# Patient Record
Sex: MALE | Race: ASIAN
[De-identification: names, ages, dates, MRNs, and addresses within clinical notes are randomized per-mention and may not be internally consistent; named-entity substitution may affect disease eponyms.]

---

## 2020-07-15 ENCOUNTER — APPOINTMENT (OUTPATIENT)
Age: 75
End: 2020-07-15
Payer: MEDICARE

## 2020-07-15 VITALS
HEART RATE: 64 BPM | HEIGHT: 67.72 IN | SYSTOLIC BLOOD PRESSURE: 126 MMHG | OXYGEN SATURATION: 97 % | BODY MASS INDEX: 26.37 KG/M2 | WEIGHT: 172 LBS | TEMPERATURE: 98.4 F | RESPIRATION RATE: 18 BRPM | DIASTOLIC BLOOD PRESSURE: 66 MMHG

## 2020-07-15 DIAGNOSIS — Z86.39 PERSONAL HISTORY OF OTHER ENDOCRINE, NUTRITIONAL AND METABOLIC DISEASE: ICD-10-CM

## 2020-07-15 DIAGNOSIS — Z86.79 PERSONAL HISTORY OF OTHER DISEASES OF THE CIRCULATORY SYSTEM: ICD-10-CM

## 2020-07-15 DIAGNOSIS — Z78.9 OTHER SPECIFIED HEALTH STATUS: ICD-10-CM

## 2020-07-15 PROBLEM — Z00.00 ENCOUNTER FOR PREVENTIVE HEALTH EXAMINATION: Status: ACTIVE | Noted: 2020-07-15

## 2020-07-15 PROCEDURE — 99203 OFFICE O/P NEW LOW 30 MIN: CPT

## 2020-07-15 RX ORDER — AMLODIPINE BESYLATE 5 MG/1
5 TABLET ORAL
Refills: 0 | Status: ACTIVE | COMMUNITY

## 2020-07-15 RX ORDER — ATORVASTATIN CALCIUM 20 MG/1
20 TABLET, FILM COATED ORAL
Refills: 0 | Status: ACTIVE | COMMUNITY

## 2020-07-15 RX ORDER — LOSARTAN POTASSIUM 100 MG/1
100 TABLET, FILM COATED ORAL
Refills: 0 | Status: ACTIVE | COMMUNITY

## 2020-07-15 NOTE — PHYSICAL EXAM
[General Appearance - Well Developed] : well developed [General Appearance - Well Nourished] : well nourished [Normal Appearance] : normal appearance [Well Groomed] : well groomed [General Appearance - In No Acute Distress] : no acute distress [Edema] : no peripheral edema [Respiration, Rhythm And Depth] : normal respiratory rhythm and effort [Exaggerated Use Of Accessory Muscles For Inspiration] : no accessory muscle use [Abdomen Soft] : soft [Abdomen Tenderness] : non-tender [Costovertebral Angle Tenderness] : no ~M costovertebral angle tenderness [Urethral Meatus] : meatus normal [Penis Abnormality] : normal circumcised penis [Urinary Bladder Findings] : the bladder was normal on palpation [Scrotum] : the scrotum was normal [Epididymis] : the epididymides were normal [Testes Tenderness] : no tenderness of the testes [Prostate Tenderness] : the prostate was not tender [Testes Mass (___cm)] : there were no testicular masses [Prostate Size ___ gm] : prostate size [unfilled] gm [No Prostate Nodules] : no prostate nodules [Normal Station and Gait] : the gait and station were normal for the patient's age [] : no rash [No Focal Deficits] : no focal deficits [Oriented To Time, Place, And Person] : oriented to person, place, and time [Affect] : the affect was normal [Mood] : the mood was normal [Not Anxious] : not anxious [No Palpable Adenopathy] : no palpable adenopathy

## 2020-07-19 PROBLEM — Z86.39 HISTORY OF HYPERLIPIDEMIA: Status: RESOLVED | Noted: 2020-07-19 | Resolved: 2020-07-19

## 2020-07-19 PROBLEM — Z86.79 HISTORY OF HYPERTENSION: Status: RESOLVED | Noted: 2020-07-19 | Resolved: 2020-07-19

## 2020-07-19 NOTE — HISTORY OF PRESENT ILLNESS
[FreeTextEntry1] : 76yo Tamazight speaking M presents with 4-5 yr history of LUTS\par Sometimes straining, incomplete bladder emptying, weak stream, urgency\par no dysuria, no gross hematuria\par no UTI history\par Nocturia 2-3/night, voiding every 3 hours\par Drinks 2L of water, 1-2 cups of coffee daily\par no incontinence\par normal bowel movements\par Has never taken BPH meds

## 2020-07-19 NOTE — ASSESSMENT
[FreeTextEntry1] : 76 yo M with BPH, LUTS\par \par - PVR = 187ml\par - Natural history of BPH and bladder outlet obstruction reviewed, risks of progression, risks of detrussor myopathy/areflexic bladder, bladder stones, UTI, worsening symptoms, risk of retention and other issues. \par All treatment options were reviewed. This included surveillance, all medical therapeutic options, all outlet procedures including office based, TURP, bipolar TURP, button vaporization, thulium/holmium, suprapubic/retropubic simple (open, robotic) prostatectomy. \par All potential side effects of treatment were reviewed including, but not limited to: short term or permanent stress urinary incontinence and/or short term or permanent erectile dysfunction, penile shortening, rectal symptoms/pain, perineal pain, risks of conversion from MIS to open surgery discussed, bleeding//life-threatening hemorrhage, rectal injury requiring colostomy or delayed recognition leading to fistula, vascular/bowel/adjacent visceral organ injury, trocar/access injury, the possibility of recognized vs. unrecognized/delayed-recognition injury, risks of thermal/blunt/sharp/retraction injury, risks of DVT, PE, MI, death, risks of cardiopulmonary/anesthesia related complications, positional injury, infection/collection/abscess, wound complications/dehiscence/seroma/cellulitis, urinoma/fistula, ureteral injury/obstruction, bladder neck contracture, penile shortening, meatal stenosis, urethral stricture, prolonged vesicostomy or capsular leak, and other complications.\par - Tamsulosin and finasteride rx transmitted\par - FU in 3 months

## 2020-07-20 LAB
APPEARANCE: CLEAR
BILIRUBIN URINE: NEGATIVE
BLOOD URINE: NEGATIVE
COLOR: COLORLESS
GLUCOSE QUALITATIVE U: NEGATIVE
KETONES URINE: NEGATIVE
LEUKOCYTE ESTERASE URINE: NEGATIVE
NITRITE URINE: NEGATIVE
PH URINE: 6.5
PROTEIN URINE: NEGATIVE
SPECIFIC GRAVITY URINE: 1.01
UROBILINOGEN URINE: NORMAL

## 2020-09-23 ENCOUNTER — APPOINTMENT (OUTPATIENT)
Age: 75
End: 2020-09-23
Payer: MEDICARE

## 2020-09-23 VITALS
HEART RATE: 76 BPM | OXYGEN SATURATION: 96 % | SYSTOLIC BLOOD PRESSURE: 136 MMHG | BODY MASS INDEX: 27.29 KG/M2 | RESPIRATION RATE: 18 BRPM | TEMPERATURE: 98.3 F | WEIGHT: 178 LBS | DIASTOLIC BLOOD PRESSURE: 74 MMHG

## 2020-09-23 PROCEDURE — 99213 OFFICE O/P EST LOW 20 MIN: CPT

## 2020-09-23 RX ORDER — TAMSULOSIN HYDROCHLORIDE 0.4 MG/1
0.4 CAPSULE ORAL
Qty: 90 | Refills: 3 | Status: DISCONTINUED | COMMUNITY
Start: 2020-07-15 | End: 2020-09-23

## 2020-10-15 NOTE — HISTORY OF PRESENT ILLNESS
[FreeTextEntry1] : 74yo Greek speaking M presents with 4-5 yr history of LUTS\par Sometimes straining, incomplete bladder emptying, weak stream, urgency\par no dysuria, no gross hematuria\par no UTI history\par Nocturia 2-3/night, voiding every 3 hours\par Drinks 2L of water, 1-2 cups of coffee daily\par no incontinence\par normal bowel movements\par Has never taken BPH meds\par \par 9/23/20 Interval history: Stopped tamsulosin and finasteride after a month\par Started complaining of dizziness and fell once so was told by PCP to stop\par LUTS initially improved with meds but resumed to baseline once stopping meds

## 2020-10-15 NOTE — ASSESSMENT
[FreeTextEntry1] : 74 yo M with BPH and LUTS\par \par - PVR = 51ml\par - OK to stop tamsulosin as this is likely the reason for his dizziness. Can continue finasteride\par - Also discussed non-medical options given the side effects he has has but pt does not want to proceed with any surgical options at this time

## 2020-10-15 NOTE — PHYSICAL EXAM
[General Appearance - Well Developed] : well developed [General Appearance - Well Nourished] : well nourished [Normal Appearance] : normal appearance [Well Groomed] : well groomed [General Appearance - In No Acute Distress] : no acute distress [Abdomen Soft] : soft [Abdomen Tenderness] : non-tender [Costovertebral Angle Tenderness] : no ~M costovertebral angle tenderness [Urethral Meatus] : meatus normal [Penis Abnormality] : normal circumcised penis [Urinary Bladder Findings] : the bladder was normal on palpation [Scrotum] : the scrotum was normal [Epididymis] : the epididymides were normal [Testes Tenderness] : no tenderness of the testes [Testes Mass (___cm)] : there were no testicular masses [Prostate Tenderness] : the prostate was not tender [No Prostate Nodules] : no prostate nodules [Prostate Size ___ gm] : prostate size [unfilled] gm [Edema] : no peripheral edema [] : no respiratory distress [Respiration, Rhythm And Depth] : normal respiratory rhythm and effort [Exaggerated Use Of Accessory Muscles For Inspiration] : no accessory muscle use [Oriented To Time, Place, And Person] : oriented to person, place, and time [Affect] : the affect was normal [Mood] : the mood was normal [Not Anxious] : not anxious [Normal Station and Gait] : the gait and station were normal for the patient's age [No Focal Deficits] : no focal deficits [No Palpable Adenopathy] : no palpable adenopathy [FreeTextEntry1] : deferred today

## 2021-09-23 ENCOUNTER — APPOINTMENT (OUTPATIENT)
Dept: UROLOGY | Facility: CLINIC | Age: 76
End: 2021-09-23
Payer: MEDICARE

## 2021-09-23 PROCEDURE — 99213 OFFICE O/P EST LOW 20 MIN: CPT

## 2021-09-23 NOTE — PHYSICAL EXAM
[General Appearance - Well Developed] : well developed [Normal Appearance] : normal appearance [General Appearance - Well Nourished] : well nourished [Well Groomed] : well groomed [General Appearance - In No Acute Distress] : no acute distress [Abdomen Soft] : soft [Abdomen Tenderness] : non-tender [Costovertebral Angle Tenderness] : no ~M costovertebral angle tenderness [Urethral Meatus] : meatus normal [Penis Abnormality] : normal circumcised penis [Urinary Bladder Findings] : the bladder was normal on palpation [Scrotum] : the scrotum was normal [Epididymis] : the epididymides were normal [Testes Tenderness] : no tenderness of the testes [Testes Mass (___cm)] : there were no testicular masses [Prostate Tenderness] : the prostate was not tender [No Prostate Nodules] : no prostate nodules [Prostate Size ___ gm] : prostate size [unfilled] gm [] : no respiratory distress [Edema] : no peripheral edema [Respiration, Rhythm And Depth] : normal respiratory rhythm and effort [Exaggerated Use Of Accessory Muscles For Inspiration] : no accessory muscle use [Oriented To Time, Place, And Person] : oriented to person, place, and time [Affect] : the affect was normal [Mood] : the mood was normal [Not Anxious] : not anxious [Normal Station and Gait] : the gait and station were normal for the patient's age [No Focal Deficits] : no focal deficits [No Palpable Adenopathy] : no palpable adenopathy

## 2021-09-26 RX ORDER — FINASTERIDE 5 MG/1
5 TABLET, FILM COATED ORAL
Qty: 90 | Refills: 3 | Status: COMPLETED | COMMUNITY
Start: 2020-07-15 | End: 2021-09-26

## 2021-09-26 NOTE — HISTORY OF PRESENT ILLNESS
[FreeTextEntry1] : 76yo Estonian speaking M presents with 4-5 yr history of LUTS\par Sometimes straining, incomplete bladder emptying, weak stream, urgency\par no dysuria, no gross hematuria\par no UTI history\par Nocturia 2-3/night, voiding every 3 hours\par Drinks 2L of water, 1-2 cups of coffee daily\par no incontinence\par normal bowel movements\par Has never taken BPH meds\par \par 9/23/20 Interval history: Stopped tamsulosin and finasteride after a month\par Started complaining of dizziness and fell once so was told by PCP to stop\par LUTS initially improved with meds but resumed to baseline once stopping meds\par \par 9/23/21 Interval history: Stopped all BPH meds completely since last visit\par some urgency but no frequency (q3hrs), nocturia 3/night\par drinks fluids in the evening including a cup of coffee\par Drinks 4 bottles of water and 3 cups of coffee throughout the day\par normal bowel movements\par no changes in health since last visit

## 2021-09-26 NOTE — ASSESSMENT
[FreeTextEntry1] : 77 yo M with BPH, LUTS\par \par - PVR = 5ml\par - UA\par - Discussed possible etiologies for LUTS. Discussed ways to manage them including behavioral modifications such as adequate hydration, controlling constipation, restricting fluids in the evening. Emphasized cutting back on caffeine intake, especially in the evening\par - Natural history of BPH and bladder outlet obstruction reviewed, risks of progression, risks of detrussor myopathy/areflexic bladder, bladder stones, UTI, worsening symptoms, risk of retention and other issues. \par All treatment options were reviewed. This included surveillance, all medical therapeutic options, all outlet procedures including office based, TURP, bipolar TURP, button vaporization, thulium/holmium, suprapubic/retropubic simple (open, robotic) prostatectomy. \par - Given that pt is voiding to completion with minimal bothersome symptoms. Will continue observation\par - FU as needed

## 2021-09-27 LAB
APPEARANCE: CLEAR
BILIRUBIN URINE: NEGATIVE
BLOOD URINE: NEGATIVE
COLOR: NORMAL
GLUCOSE QUALITATIVE U: NEGATIVE
KETONES URINE: NEGATIVE
LEUKOCYTE ESTERASE URINE: NEGATIVE
NITRITE URINE: NEGATIVE
PH URINE: 6.5
PROTEIN URINE: NEGATIVE
SPECIFIC GRAVITY URINE: 1.01
UROBILINOGEN URINE: NORMAL

## 2023-02-06 ENCOUNTER — APPOINTMENT (OUTPATIENT)
Dept: UROLOGY | Facility: CLINIC | Age: 78
End: 2023-02-06
Payer: MEDICARE

## 2023-02-06 VITALS
SYSTOLIC BLOOD PRESSURE: 93 MMHG | WEIGHT: 175 LBS | HEIGHT: 67.72 IN | OXYGEN SATURATION: 98 % | HEART RATE: 75 BPM | BODY MASS INDEX: 26.83 KG/M2 | DIASTOLIC BLOOD PRESSURE: 55 MMHG | TEMPERATURE: 98.3 F

## 2023-02-06 DIAGNOSIS — Z87.891 PERSONAL HISTORY OF NICOTINE DEPENDENCE: ICD-10-CM

## 2023-02-06 PROCEDURE — 99214 OFFICE O/P EST MOD 30 MIN: CPT

## 2023-02-13 LAB
APPEARANCE: CLEAR
BACTERIA UR CULT: NORMAL
BACTERIA: NEGATIVE
BILIRUBIN URINE: NEGATIVE
BLOOD URINE: NEGATIVE
COLOR: YELLOW
GLUCOSE QUALITATIVE U: ABNORMAL
HYALINE CASTS: 2 /LPF
KETONES URINE: NEGATIVE
LEUKOCYTE ESTERASE URINE: NEGATIVE
MICROSCOPIC-UA: NORMAL
NITRITE URINE: NEGATIVE
PH URINE: 6
PROTEIN URINE: NORMAL
RED BLOOD CELLS URINE: 1 /HPF
SPECIFIC GRAVITY URINE: 1.03
SQUAMOUS EPITHELIAL CELLS: 0 /HPF
URINE CYTOLOGY: NORMAL
UROBILINOGEN URINE: NORMAL
WHITE BLOOD CELLS URINE: 1 /HPF

## 2023-02-20 NOTE — PHYSICAL EXAM
[General Appearance - Well Developed] : well developed [General Appearance - Well Nourished] : well nourished [Normal Appearance] : normal appearance [Well Groomed] : well groomed [General Appearance - In No Acute Distress] : no acute distress [Abdomen Soft] : soft [Costovertebral Angle Tenderness] : no ~M costovertebral angle tenderness [Abdomen Tenderness] : non-tender [Urethral Meatus] : meatus normal [Penis Abnormality] : normal circumcised penis [Urinary Bladder Findings] : the bladder was normal on palpation [Scrotum] : the scrotum was normal [Epididymis] : the epididymides were normal [Testes Tenderness] : no tenderness of the testes [Testes Mass (___cm)] : there were no testicular masses [Prostate Tenderness] : the prostate was not tender [No Prostate Nodules] : no prostate nodules [Prostate Size ___ gm] : prostate size [unfilled] gm [FreeTextEntry1] : deferred today [Edema] : no peripheral edema [] : no respiratory distress [Respiration, Rhythm And Depth] : normal respiratory rhythm and effort [Exaggerated Use Of Accessory Muscles For Inspiration] : no accessory muscle use [Oriented To Time, Place, And Person] : oriented to person, place, and time [Affect] : the affect was normal [Mood] : the mood was normal [Not Anxious] : not anxious [Normal Station and Gait] : the gait and station were normal for the patient's age [No Focal Deficits] : no focal deficits [No Palpable Adenopathy] : no palpable adenopathy

## 2023-02-20 NOTE — ASSESSMENT
[FreeTextEntry1] : 76 yo M with BPH, gross hematuria\par \par - PVR = 50ml\par - UA, culture, cytology\par - Discussed possible etiologies for hematuria including benign (UTI, nephrolithiasis, cyst, BPH) vs malignancy (renal, ureteral and bladder). Discussed hematuria workup which includes upper tract imaging such as US or CT urogram and cystoscopy. Discussed risk and benefits of cystoscopy.\par - Discussed pros and cons of resuming tamsulosin. Warmed pt and his wife regarding dizziness side effect. Rx transmitted

## 2023-02-20 NOTE — HISTORY OF PRESENT ILLNESS
[FreeTextEntry1] : 74yo Maori speaking M presents with 4-5 yr history of LUTS\par Sometimes straining, incomplete bladder emptying, weak stream, urgency\par no dysuria, no gross hematuria\par no UTI history\par Nocturia 2-3/night, voiding every 3 hours\par Drinks 2L of water, 1-2 cups of coffee daily\par no incontinence\par normal bowel movements\par Has never taken BPH meds\par \par 9/23/20 Interval history: Stopped tamsulosin and finasteride after a month\par Started complaining of dizziness and fell once so was told by PCP to stop\par LUTS initially improved with meds but resumed to baseline once stopping meds\par \par 9/23/21 Interval history: Stopped all BPH meds completely since last visit\par some urgency but no frequency (q3hrs), nocturia 3/night\par drinks fluids in the evening including a cup of coffee\par Drinks 4 bottles of water and 3 cups of coffee throughout the day\par normal bowel movements\par no changes in health since last visit\par \par 2/6/23 Interval history: Intermittent gross hematuria for the last 3 days\par no dysuria\par no kidney stone history\par Some weak flow, some urinary urgency\par Drinks 1 cup of coffee, 4-5 glasses of water daily

## 2023-03-13 ENCOUNTER — APPOINTMENT (OUTPATIENT)
Dept: UROLOGY | Facility: CLINIC | Age: 78
End: 2023-03-13
Payer: MEDICARE

## 2023-03-13 VITALS
HEART RATE: 80 BPM | DIASTOLIC BLOOD PRESSURE: 62 MMHG | HEIGHT: 67.72 IN | WEIGHT: 175 LBS | BODY MASS INDEX: 26.83 KG/M2 | OXYGEN SATURATION: 99 % | TEMPERATURE: 97.9 F | SYSTOLIC BLOOD PRESSURE: 102 MMHG

## 2023-03-13 PROCEDURE — 52000 CYSTOURETHROSCOPY: CPT

## 2023-03-13 RX ORDER — TAMSULOSIN HYDROCHLORIDE 0.4 MG/1
0.4 CAPSULE ORAL
Qty: 90 | Refills: 3 | Status: COMPLETED | COMMUNITY
Start: 2023-02-06 | End: 2023-03-13

## 2023-05-02 ENCOUNTER — APPOINTMENT (OUTPATIENT)
Dept: UROLOGY | Facility: HOSPITAL | Age: 78
End: 2023-05-02

## 2023-05-17 ENCOUNTER — APPOINTMENT (OUTPATIENT)
Dept: UROLOGY | Facility: CLINIC | Age: 78
End: 2023-05-17
Payer: MEDICARE

## 2023-05-17 VITALS
DIASTOLIC BLOOD PRESSURE: 73 MMHG | WEIGHT: 175 LBS | BODY MASS INDEX: 26.52 KG/M2 | OXYGEN SATURATION: 98 % | HEART RATE: 75 BPM | TEMPERATURE: 98.1 F | HEIGHT: 68 IN | SYSTOLIC BLOOD PRESSURE: 139 MMHG

## 2023-05-17 PROCEDURE — 99214 OFFICE O/P EST MOD 30 MIN: CPT

## 2023-05-17 NOTE — HISTORY OF PRESENT ILLNESS
[FreeTextEntry1] : Very pleasant 78-year-old gentleman who presents for follow-up of bladder tumor, atypical urine cytology, hematuria.  He reports no problems over the last 2 months.  He presents today with his wife to discuss the options for management moving forward.  He was previously recommended to undergo a TURBT with Dr. Brar, however he and his wife report that they do not wish for him to have surgery at this time and therefore did not schedule the procedure.

## 2023-05-17 NOTE — ASSESSMENT
[FreeTextEntry1] : Very pleasant 78-year-old gentleman who presents for follow-up of gross hematuria, atypical urine cytology, bladder mass on cystoscopy and CT scan\par -CT images from [facility] radiology reviewed demonstrating concern for an anterior bladder mass\par -Cystoscopy previously demonstrates a small bladder tumor in the anterior bladder wall\par -Urine cytology atypical\par -Urine culture today\par -BMP today\par -We discussed that I highly recommend that he proceed with a TURBT in the operating room.  He is very hesitant to do so.  We discussed the possibility of being able to address a small bladder tumor in the office and he would like to try this process.  We discussed that if I perform a cystoscopy and feel that I would be unable to adequately treat the tumor in the office I would highly recommend that he undergo a TURBT which she is now amenable to doing\par -Follow-up for cystoscopy

## 2023-05-18 LAB
ANION GAP SERPL CALC-SCNC: 13 MMOL/L
BACTERIA UR CULT: NORMAL
BUN SERPL-MCNC: 8 MG/DL
CALCIUM SERPL-MCNC: 9.3 MG/DL
CHLORIDE SERPL-SCNC: 106 MMOL/L
CO2 SERPL-SCNC: 24 MMOL/L
CREAT SERPL-MCNC: 0.68 MG/DL
EGFR: 95 ML/MIN/1.73M2
GLUCOSE SERPL-MCNC: 121 MG/DL
POTASSIUM SERPL-SCNC: 3.8 MMOL/L
SODIUM SERPL-SCNC: 143 MMOL/L

## 2023-05-26 ENCOUNTER — APPOINTMENT (OUTPATIENT)
Dept: UROLOGY | Facility: CLINIC | Age: 78
End: 2023-05-26
Payer: MEDICARE

## 2023-05-26 PROCEDURE — 52224Z: CUSTOM

## 2023-06-09 ENCOUNTER — APPOINTMENT (OUTPATIENT)
Dept: UROLOGY | Facility: CLINIC | Age: 78
End: 2023-06-09
Payer: MEDICARE

## 2023-06-09 VITALS
DIASTOLIC BLOOD PRESSURE: 79 MMHG | OXYGEN SATURATION: 99 % | HEART RATE: 78 BPM | HEIGHT: 68 IN | TEMPERATURE: 97.4 F | BODY MASS INDEX: 26.52 KG/M2 | WEIGHT: 175 LBS | SYSTOLIC BLOOD PRESSURE: 145 MMHG

## 2023-06-09 DIAGNOSIS — N13.8 BENIGN PROSTATIC HYPERPLASIA WITH LOWER URINARY TRACT SYMPMS: ICD-10-CM

## 2023-06-09 DIAGNOSIS — N40.1 BENIGN PROSTATIC HYPERPLASIA WITH LOWER URINARY TRACT SYMPMS: ICD-10-CM

## 2023-06-09 LAB — CORE LAB BIOPSY: NORMAL

## 2023-06-09 PROCEDURE — 99214 OFFICE O/P EST MOD 30 MIN: CPT

## 2023-06-09 NOTE — ASSESSMENT
[FreeTextEntry1] : Very pleasant 78-year-old gentleman who presents for follow-up of gross hematuria, atypical urine cytology, new finding of low-grade noninvasive bladder cancer\par -Pathology results reviewed with the patient and his wife demonstrating low-grade noninvasive bladder cancer\par -Urine culture negative\par -Cytology atypical\par -We discussed options for management of low-grade noninvasive bladder cancer, including observation with repeat cystoscopy in 3 months versus intravesical bladder installations.  We discussed different types of intravesical bladder installations, as well as the risks and benefits of bladder installations at length\par -At this time he wishes to proceed with mitomycin instillations\par -Follow-up next week for first mitomycin instillation

## 2023-06-09 NOTE — HISTORY OF PRESENT ILLNESS
[FreeTextEntry1] : Very pleasant 78-year-old gentleman who presents for follow-up of bladder tumor, atypical urine cytology, hematuria, new diagnosis of bladder cancer.  He recently underwent a bladder biopsy with fulguration in the office which demonstrated low-grade noninvasive papillary urothelial carcinoma.  He reports no problems after the procedure.  He denies hematuria.  No pain nor dysuria.  No other complaints.  He is voiding well.

## 2023-09-08 ENCOUNTER — TRANSCRIPTION ENCOUNTER (OUTPATIENT)
Age: 78
End: 2023-09-08

## 2023-09-08 ENCOUNTER — INPATIENT (INPATIENT)
Facility: HOSPITAL | Age: 78
LOS: 0 days | Discharge: HOME | DRG: 287 | End: 2023-09-08
Attending: HOSPITALIST | Admitting: HOSPITALIST
Payer: MEDICARE

## 2023-09-08 VITALS
HEIGHT: 68 IN | WEIGHT: 175.05 LBS | TEMPERATURE: 98 F | RESPIRATION RATE: 18 BRPM | SYSTOLIC BLOOD PRESSURE: 145 MMHG | DIASTOLIC BLOOD PRESSURE: 76 MMHG | OXYGEN SATURATION: 97 % | HEART RATE: 80 BPM

## 2023-09-08 VITALS
TEMPERATURE: 98 F | DIASTOLIC BLOOD PRESSURE: 68 MMHG | OXYGEN SATURATION: 97 % | RESPIRATION RATE: 17 BRPM | HEART RATE: 57 BPM | SYSTOLIC BLOOD PRESSURE: 146 MMHG

## 2023-09-08 DIAGNOSIS — E78.5 HYPERLIPIDEMIA, UNSPECIFIED: ICD-10-CM

## 2023-09-08 DIAGNOSIS — I20.0 UNSTABLE ANGINA: ICD-10-CM

## 2023-09-08 DIAGNOSIS — Z98.890 OTHER SPECIFIED POSTPROCEDURAL STATES: Chronic | ICD-10-CM

## 2023-09-08 DIAGNOSIS — C67.9 MALIGNANT NEOPLASM OF BLADDER, UNSPECIFIED: ICD-10-CM

## 2023-09-08 DIAGNOSIS — I10 ESSENTIAL (PRIMARY) HYPERTENSION: ICD-10-CM

## 2023-09-08 LAB
ALBUMIN SERPL ELPH-MCNC: 4.1 G/DL — SIGNIFICANT CHANGE UP (ref 3.3–5)
ALP SERPL-CCNC: 57 U/L — SIGNIFICANT CHANGE UP (ref 40–120)
ALT FLD-CCNC: 45 U/L — SIGNIFICANT CHANGE UP (ref 10–45)
ANION GAP SERPL CALC-SCNC: 9 MMOL/L — SIGNIFICANT CHANGE UP (ref 5–17)
APTT BLD: 31.1 SEC — SIGNIFICANT CHANGE UP (ref 24.5–35.6)
AST SERPL-CCNC: 54 U/L — HIGH (ref 10–40)
BASOPHILS # BLD AUTO: 0.05 K/UL — SIGNIFICANT CHANGE UP (ref 0–0.2)
BASOPHILS NFR BLD AUTO: 1 % — SIGNIFICANT CHANGE UP (ref 0–2)
BILIRUB SERPL-MCNC: 0.4 MG/DL — SIGNIFICANT CHANGE UP (ref 0.2–1.2)
BLD GP AB SCN SERPL QL: NEGATIVE — SIGNIFICANT CHANGE UP
BUN SERPL-MCNC: 11 MG/DL — SIGNIFICANT CHANGE UP (ref 7–23)
CALCIUM SERPL-MCNC: 9 MG/DL — SIGNIFICANT CHANGE UP (ref 8.4–10.5)
CHLORIDE SERPL-SCNC: 101 MMOL/L — SIGNIFICANT CHANGE UP (ref 96–108)
CO2 SERPL-SCNC: 25 MMOL/L — SIGNIFICANT CHANGE UP (ref 22–31)
CREAT SERPL-MCNC: 0.68 MG/DL — SIGNIFICANT CHANGE UP (ref 0.5–1.3)
EGFR: 95 ML/MIN/1.73M2 — SIGNIFICANT CHANGE UP
EOSINOPHIL # BLD AUTO: 0.13 K/UL — SIGNIFICANT CHANGE UP (ref 0–0.5)
EOSINOPHIL NFR BLD AUTO: 2.6 % — SIGNIFICANT CHANGE UP (ref 0–6)
GLUCOSE SERPL-MCNC: 145 MG/DL — HIGH (ref 70–99)
HCT VFR BLD CALC: 39.3 % — SIGNIFICANT CHANGE UP (ref 39–50)
HGB BLD-MCNC: 13.5 G/DL — SIGNIFICANT CHANGE UP (ref 13–17)
IMM GRANULOCYTES NFR BLD AUTO: 0.2 % — SIGNIFICANT CHANGE UP (ref 0–0.9)
INR BLD: 0.94 — SIGNIFICANT CHANGE UP (ref 0.85–1.18)
LYMPHOCYTES # BLD AUTO: 1.9 K/UL — SIGNIFICANT CHANGE UP (ref 1–3.3)
LYMPHOCYTES # BLD AUTO: 38.6 % — SIGNIFICANT CHANGE UP (ref 13–44)
MCHC RBC-ENTMCNC: 33.4 PG — SIGNIFICANT CHANGE UP (ref 27–34)
MCHC RBC-ENTMCNC: 34.4 GM/DL — SIGNIFICANT CHANGE UP (ref 32–36)
MCV RBC AUTO: 97.3 FL — SIGNIFICANT CHANGE UP (ref 80–100)
MONOCYTES # BLD AUTO: 0.33 K/UL — SIGNIFICANT CHANGE UP (ref 0–0.9)
MONOCYTES NFR BLD AUTO: 6.7 % — SIGNIFICANT CHANGE UP (ref 2–14)
NEUTROPHILS # BLD AUTO: 2.5 K/UL — SIGNIFICANT CHANGE UP (ref 1.8–7.4)
NEUTROPHILS NFR BLD AUTO: 50.9 % — SIGNIFICANT CHANGE UP (ref 43–77)
NRBC # BLD: 0 /100 WBCS — SIGNIFICANT CHANGE UP (ref 0–0)
PLATELET # BLD AUTO: 134 K/UL — LOW (ref 150–400)
POTASSIUM SERPL-MCNC: 3.8 MMOL/L — SIGNIFICANT CHANGE UP (ref 3.5–5.3)
POTASSIUM SERPL-SCNC: 3.8 MMOL/L — SIGNIFICANT CHANGE UP (ref 3.5–5.3)
PROT SERPL-MCNC: 7 G/DL — SIGNIFICANT CHANGE UP (ref 6–8.3)
PROTHROM AB SERPL-ACNC: 10.7 SEC — SIGNIFICANT CHANGE UP (ref 9.5–13)
RBC # BLD: 4.04 M/UL — LOW (ref 4.2–5.8)
RBC # FLD: 12.2 % — SIGNIFICANT CHANGE UP (ref 10.3–14.5)
RH IG SCN BLD-IMP: POSITIVE — SIGNIFICANT CHANGE UP
SODIUM SERPL-SCNC: 135 MMOL/L — SIGNIFICANT CHANGE UP (ref 135–145)
TROPONIN T, HIGH SENSITIVITY RESULT: 7 NG/L — SIGNIFICANT CHANGE UP (ref 0–51)
WBC # BLD: 4.92 K/UL — SIGNIFICANT CHANGE UP (ref 3.8–10.5)
WBC # FLD AUTO: 4.92 K/UL — SIGNIFICANT CHANGE UP (ref 3.8–10.5)

## 2023-09-08 PROCEDURE — 82550 ASSAY OF CK (CPK): CPT

## 2023-09-08 PROCEDURE — 84484 ASSAY OF TROPONIN QUANT: CPT

## 2023-09-08 PROCEDURE — C1887: CPT

## 2023-09-08 PROCEDURE — 99285 EMERGENCY DEPT VISIT HI MDM: CPT

## 2023-09-08 PROCEDURE — 85025 COMPLETE CBC W/AUTO DIFF WBC: CPT

## 2023-09-08 PROCEDURE — 85610 PROTHROMBIN TIME: CPT

## 2023-09-08 PROCEDURE — C1894: CPT

## 2023-09-08 PROCEDURE — 99152 MOD SED SAME PHYS/QHP 5/>YRS: CPT

## 2023-09-08 PROCEDURE — 71045 X-RAY EXAM CHEST 1 VIEW: CPT | Mod: 26

## 2023-09-08 PROCEDURE — 86850 RBC ANTIBODY SCREEN: CPT

## 2023-09-08 PROCEDURE — 36415 COLL VENOUS BLD VENIPUNCTURE: CPT

## 2023-09-08 PROCEDURE — 93458 L HRT ARTERY/VENTRICLE ANGIO: CPT | Mod: 26

## 2023-09-08 PROCEDURE — 86900 BLOOD TYPING SEROLOGIC ABO: CPT

## 2023-09-08 PROCEDURE — 85730 THROMBOPLASTIN TIME PARTIAL: CPT

## 2023-09-08 PROCEDURE — 80053 COMPREHEN METABOLIC PANEL: CPT

## 2023-09-08 PROCEDURE — 93005 ELECTROCARDIOGRAM TRACING: CPT

## 2023-09-08 PROCEDURE — 93571 IV DOP VEL&/PRESS C FLO 1ST: CPT | Mod: 26,LD

## 2023-09-08 PROCEDURE — 71045 X-RAY EXAM CHEST 1 VIEW: CPT

## 2023-09-08 PROCEDURE — C1769: CPT

## 2023-09-08 PROCEDURE — 93010 ELECTROCARDIOGRAM REPORT: CPT

## 2023-09-08 PROCEDURE — 86901 BLOOD TYPING SEROLOGIC RH(D): CPT

## 2023-09-08 PROCEDURE — 99223 1ST HOSP IP/OBS HIGH 75: CPT

## 2023-09-08 PROCEDURE — 82553 CREATINE MB FRACTION: CPT

## 2023-09-08 RX ORDER — ATORVASTATIN CALCIUM 80 MG/1
20 TABLET, FILM COATED ORAL AT BEDTIME
Refills: 0 | Status: DISCONTINUED | OUTPATIENT
Start: 2023-09-08 | End: 2023-09-08

## 2023-09-08 RX ORDER — ASPIRIN/CALCIUM CARB/MAGNESIUM 324 MG
81 TABLET ORAL DAILY
Refills: 0 | Status: DISCONTINUED | OUTPATIENT
Start: 2023-09-08 | End: 2023-09-08

## 2023-09-08 RX ORDER — ALLOPURINOL 300 MG
100 TABLET ORAL DAILY
Refills: 0 | Status: DISCONTINUED | OUTPATIENT
Start: 2023-09-08 | End: 2023-09-08

## 2023-09-08 RX ORDER — AMLODIPINE BESYLATE 2.5 MG/1
5 TABLET ORAL DAILY
Refills: 0 | Status: DISCONTINUED | OUTPATIENT
Start: 2023-09-08 | End: 2023-09-08

## 2023-09-08 RX ORDER — SODIUM CHLORIDE 9 MG/ML
250 INJECTION INTRAMUSCULAR; INTRAVENOUS; SUBCUTANEOUS ONCE
Refills: 0 | Status: COMPLETED | OUTPATIENT
Start: 2023-09-08 | End: 2023-09-08

## 2023-09-08 RX ORDER — FINASTERIDE 5 MG/1
5 TABLET, FILM COATED ORAL DAILY
Refills: 0 | Status: DISCONTINUED | OUTPATIENT
Start: 2023-09-08 | End: 2023-09-08

## 2023-09-08 RX ORDER — LOSARTAN POTASSIUM 100 MG/1
100 TABLET, FILM COATED ORAL DAILY
Refills: 0 | Status: DISCONTINUED | OUTPATIENT
Start: 2023-09-09 | End: 2023-09-08

## 2023-09-08 RX ORDER — SODIUM CHLORIDE 9 MG/ML
1000 INJECTION INTRAMUSCULAR; INTRAVENOUS; SUBCUTANEOUS
Refills: 0 | Status: DISCONTINUED | OUTPATIENT
Start: 2023-09-08 | End: 2023-09-08

## 2023-09-08 RX ORDER — POTASSIUM CHLORIDE 20 MEQ
40 PACKET (EA) ORAL ONCE
Refills: 0 | Status: COMPLETED | OUTPATIENT
Start: 2023-09-08 | End: 2023-09-08

## 2023-09-08 RX ORDER — ASPIRIN/CALCIUM CARB/MAGNESIUM 324 MG
243 TABLET ORAL ONCE
Refills: 0 | Status: COMPLETED | OUTPATIENT
Start: 2023-09-08 | End: 2023-09-08

## 2023-09-08 RX ORDER — CLOPIDOGREL BISULFATE 75 MG/1
600 TABLET, FILM COATED ORAL ONCE
Refills: 0 | Status: COMPLETED | OUTPATIENT
Start: 2023-09-08 | End: 2023-09-08

## 2023-09-08 RX ADMIN — CLOPIDOGREL BISULFATE 600 MILLIGRAM(S): 75 TABLET, FILM COATED ORAL at 09:10

## 2023-09-08 RX ADMIN — Medication 243 MILLIGRAM(S): at 10:35

## 2023-09-08 RX ADMIN — SODIUM CHLORIDE 250 MILLILITER(S): 9 INJECTION INTRAMUSCULAR; INTRAVENOUS; SUBCUTANEOUS at 13:55

## 2023-09-08 RX ADMIN — SODIUM CHLORIDE 75 MILLILITER(S): 9 INJECTION INTRAMUSCULAR; INTRAVENOUS; SUBCUTANEOUS at 10:43

## 2023-09-08 RX ADMIN — SODIUM CHLORIDE 500 MILLILITER(S): 9 INJECTION INTRAMUSCULAR; INTRAVENOUS; SUBCUTANEOUS at 09:10

## 2023-09-08 RX ADMIN — Medication 40 MILLIEQUIVALENT(S): at 09:21

## 2023-09-08 RX ADMIN — Medication 81 MILLIGRAM(S): at 09:20

## 2023-09-08 NOTE — DISCHARGE NOTE PROVIDER - CARE PROVIDER_API CALL
After Visit Summary   6/19/2018    Екатерина Ramon    MRN: [id number]           Patient Information     Date Of Birth          1987        Visit Information        Provider Department      6/19/2018 5:00 PM Zack Healy, DPJUAN MIGUEL [facility]        Today's Diagnoses     Bunion    -  1    Instability of foot joint, unspecified laterality        Pronation of both feet          Care Instructions    We wish you continued good healing. If you have any questions or concerns, please do not hesitate to contact us at [phone]    Please remember to call and schedule a follow up appointment if one was recommended at your earliest convenience.   PODIATRY CLINIC HOURS  TELEPHONE NUMBER    Dr. Zack IRENEPPEMA [facility]    Clinics:  [facility]    Carlita Handy [facility]   Tuesday 1PM-6PM  [place]  Wednesday 7AM-2PM  [facility]  Thursday 10AM-6PM  [place]  Friday 7AM-3PM  [place]  Specialty schedulers:   [phone] to make an appointment with any Specialty Provider.        Urgent Care locations:    [facility] Monday-Friday 5 pm - 9 pm. Saturday-Sunday 9 am -5pm    Monday-Friday 11 am - 9 pm Saturday 9 am - 5 pm     Monday-Sunday 12 noon-8PM [phone]     If you need a medication refill, please contact us you may need lab work and/or a follow up visit prior to your refill (i.e. Antifungal medications).    ClinicIQt (secure e-mail communication and access to your chart) to send a message or to make an appointment.    If MRI needed please call [facility] at [phone]        Weight management plan: Patient was referred to their PCP to discuss a diet and exercise plan.    Patient to follow up with Primary Care provider regarding elevated blood pressure.            Follow-ups after your visit        Your next 10 appointments already scheduled      Aug 01, 2018 11:30 AM CDT   SHORT with Lorraine Johnson MD   [facility] ([facility])    [address] [place] [id number] [phone]              Who to contact     If you have questions or need follow up information about today's clinic visit or your schedule please contact [facility] directly at [phone].  Normal or non-critical lab and imaging results will be communicated to you by [employer], letter or phone within 4 business days after the clinic has received the results. If you do not hear from us within 7 days, please contact the clinic through [employer] or phone. If you have a critical or abnormal lab result, we will notify you by phone as soon as possible.  Submit refill requests through [employer] or call your pharmacy and they will forward the refill request to us. Please allow 3 business days for your refill to be completed.          Additional Information About Your Visit        [employer]ia Information     [employer] gives you secure access to your electronic health record. If you see a primary care provider, you can also send messages to your care team and make appointments. If you have questions, please call your primary care clinic.  If you do not have a primary care provider, please call [phone] and they will assist you.        Care EveryWhere ID     This is your Care EveryWhere ID. This could be used by other organizations to access your [facility] medical records  [id number]        Your Vitals Were     Pulse Temperature Respirations Pulse Oximetry BMI (Body Mass Index)       90 98.3  F (36.8  C) (Oral) 18 100% 36.14 kg/m2        Blood Pressure from Last 3 Encounters:   06/19/18 (!) 127/92   06/11/18 120/88   05/09/18 126/88    Weight from Last 3 Encounters:   06/19/18 204 lb (92.5 kg)   06/11/18 202 lb (91.6 kg)   05/09/18 201 lb (91.2 kg)              We Performed the Following     Rosie-Operative Worksheet - Foot/Ankle        Primary Care  Provider Office Phone # Fax #    Anusha Wolff, ESTUARDO ELIE [phone] [address] [facility]  [facility] [address]        Equal Access to Services     CHRIS WALTERS : Hadii flori ku hadatyloro Soomaali, waaxda luqadaha, qaybta kaalmada adeegyada, lasha malikn jimmy case laVenitagavino payne. So [facility] [phone].    ATENCIÓN: Si habla español, tiene a ramirez disposición servicios gratuitos de asistencia lingüística. Llame al [phone].    We comply with applicable federal civil rights laws and [place] laws. We do not discriminate on the basis of race, color, national origin, age, disability, sex, sexual orientation, or gender identity.            Thank you!     Thank you for choosing [facility]  for your care. Our goal is always to provide you with excellent care. Hearing back from our patients is one way we can continue to improve our services. Please take a few minutes to complete the written survey that you may receive in the mail after your visit with us. Thank you!             Your Updated Medication List - Protect others around you: Learn how to safely use, store and throw away your medicines at www.disposemymeds.org.          This list is accurate as of 6/19/18 11:59 PM.  Always use your most recent med list.                   Brand Name Dispense Instructions for use Diagnosis    etonogestrel 68 MG Impl    IMPLANON/NEXPLANON     1 each by Subdermal route once        FLUoxetine 10 MG capsule    PROzac    90 capsule    Take 3 capsules (30 mg) by mouth daily    Anxiety       hydrochlorothiazide 25 MG tablet    HYDRODIURIL    90 tablet    Take 1 tablet (25 mg) by mouth daily    Hypertension goal BP (blood pressure) < 140/90       insulin pen needle 32G X 4 MM    BD TALHA U/F    100 each    Use once daily or as directed.    Morbid obesity due to excess calories (H)       liraglutide 18 MG/3ML soln    VICTOZA    36 mL    Inject 1.8 mg Subcutaneous daily    Morbid obesity due to excess  calories (H)       traMADol 50 MG tablet    ULTRAM    20 tablet    Take 1-2 tablets ( mg) by mouth every 6 hours as needed for pain Max of 4 tabs per day    Chronic midline low back pain without sciatica, Chronic bilateral thoracic back pain       verapamil 120 MG 24 hr capsule    VERELAN    90 capsule    Take 1 capsule (120 mg) by mouth daily    Hypertension goal BP (blood pressure) < 140/90, Migraine with aura and without status migrainosus, not intractable          Eladio Keenan J  Cardiovascular Disease  [address] [place] [address]  Phone: [phone]  Fax: [phone]  Follow Up Time: 2 weeks   Eladio Keenan J  Cardiovascular Disease  [address] [place] [address]  Phone: [phone]  Fax: [phone]  Follow Up Time: 2 weeks   Eladio Keenan J  Cardiovascular Disease  [address] [place] [address]  Phone: [phone]  Fax: [phone]  Follow Up Time: 2 weeks

## 2023-09-08 NOTE — DISCHARGE NOTE PROVIDER - HOSPITAL COURSE
78M, Chinese speaking, former smoker, w/ PMHx of HTN, HLD, RBBB, PVCs, and Bladder CA (s/p resection 6/2023, pending further monitoring prior to possible chemo/radiation), was referred to [facility] ED by outpt cardiologist, Dr. Keenan, for worsening CP and abnormal outpt CCTA. Pt reports exertional substernal chest tightness x 2-3 months that has now progressed to at rest. He endorses associated SOB, fatigue and dizziness. He denies any palpitations, syncope, diaphoresis, orthopnea, PND, LE edema, N/V, abd pain, fever, chills or recent sick contact.    In office, CCTA revealed Ca score 381, p/mLAD multifocal calcified and soft plaques w/ mod stenosis (50-69%) and TTE revealed normal LVEF w/o WMA, mild AI.    In ED, VSS: /76, HR 80bpm, T 97.8, RR 18, SpO2 97% RA. Labs significant for hsTropT 7, CK/CKMB negative, K 3.8, EKG: SR, RBBB, 1st degree AVB, non ischemic. CXR: left hilar asymmetry/mass, cardiomegaly and left epipericardial fat pad.    Pt now admitted to cardiac telemetry for further management of unstable angina. Pt now s/p dx cardiac cath   78M, Amharic speaking, former smoker, w/ PMHx of HTN, HLD, RBBB, PVCs, and Bladder CA (s/p resection 6/2023, pending further monitoring prior to possible chemo/radiation), was referred to [facility] ED by outpt cardiologist, Dr. Keenan, for worsening CP and abnormal outpt CCTA. Pt reports exertional substernal chest tightness x 2-3 months that has now progressed to at rest. He endorses associated SOB, fatigue and dizziness. He denies any palpitations, syncope, diaphoresis, orthopnea, PND, LE edema, N/V, abd pain, fever, chills or recent sick contact.    In office, CCTA revealed Ca score 381, p/mLAD multifocal calcified and soft plaques w/ mod stenosis (50-69%) and TTE revealed normal LVEF w/o WMA, mild AI.    In ED, VSS: /76, HR 80bpm, T 97.8, RR 18, SpO2 97% RA. Labs significant for hsTropT 7, CK/CKMB negative, K 3.8, EKG: SR, RBBB, 1st degree AVB, non ischemic. CXR: left hilar asymmetry/mass, cardiomegaly and left epipericardial fat pad.    Pt now admitted to cardiac telemetry for further management of unstable angina. Pt now s/p dx cardiac cath   78M, Turkmen speaking, former smoker, w/ PMHx of HTN, HLD, RBBB, PVCs, and Bladder CA (s/p resection 6/2023, pending further monitoring prior to possible chemo/radiation), was referred to [facility] ED by outpt cardiologist, Dr. Keenan, for worsening CP and abnormal outpt CCTA. Pt reports exertional substernal chest tightness x 2-3 months that has now progressed to at rest. He endorses associated SOB, fatigue and dizziness. He denies any palpitations, syncope, diaphoresis, orthopnea, PND, LE edema, N/V, abd pain, fever, chills or recent sick contact.    In office, CCTA revealed Ca score 381, p/mLAD multifocal calcified and soft plaques w/ mod stenosis (50-69%) and TTE revealed normal LVEF w/o WMA, mild AI.    In ED, VSS: /76, HR 80bpm, T 97.8, RR 18, SpO2 97% RA. Labs significant for hsTropT 7, CK/CKMB negative, K 3.8, EKG: SR, RBBB, 1st degree AVB, non ischemic. CXR: left hilar asymmetry/mass, cardiomegaly and left epipericardial fat pad.    Pt now admitted to cardiac telemetry for further management of unstable angina. Pt now s/p dx cardiac cath   78M, Yoruba speaking, former smoker, w/ PMHx of HTN, HLD, RBBB, PVCs, and Bladder CA (s/p resection 6/2023, pending further monitoring prior to possible chemo/radiation), was referred to [facility] ED by outpt cardiologist, Dr. Keenan, for abnormal outpt CCTA and worsening exertional substernal chest tightness that progressed to rest w/ associated SOB/dizziness/fatigue. In office, CCTA revealed Ca score 381, p/mLAD multifocal calcified and soft plaques w/ mod stenosis (50-69%) and TTE revealed normal LVEF w/o WMA, mild AI.   In ED, VSS, labs significant for hsTropT 7, CK/CKMB negative. EKG: SR, RBBB, 1st degree AVB, non ischemic. CXR: left hilar asymmetry/mass, cardiomegaly and left epipericardial fat pad, recommending CT chest. Pt was admitted to cardiac telemetry for further management of unstable angina. Pt now s/p dx cardiac cath revealing pLAD 30-50% (FFR 0.93), LM/LCx/RCA w/ minor disease, EDP 7, access R radial.  Pt remained asymptomatic, HD stable, VSS, labs and telemetry reviewed and pt stable for discharge as discussed with Dr. Mccoy. Pt has received appropriate discharge instructions, including medication regimen, access site management and follow up with Dr. Keenan in 1-2 weeks. 78M, Romanian speaking, former smoker, w/ PMHx of HTN, HLD, RBBB, PVCs, and Bladder CA (s/p resection 6/2023, pending further monitoring prior to possible chemo/radiation), was referred to [facility] ED by outpt cardiologist, Dr. Keenan, for abnormal outpt CCTA and worsening exertional substernal chest tightness that progressed to rest w/ associated SOB/dizziness/fatigue. In office, CCTA revealed Ca score 381, p/mLAD multifocal calcified and soft plaques w/ mod stenosis (50-69%) and TTE revealed normal LVEF w/o WMA, mild AI.   In ED, VSS, labs significant for hsTropT 7, CK/CKMB negative. EKG: SR, RBBB, 1st degree AVB, non ischemic. CXR: left hilar asymmetry/mass, cardiomegaly and left epipericardial fat pad, recommending CT chest. Pt was admitted to cardiac telemetry for further management of unstable angina. Pt now s/p dx cardiac cath revealing pLAD 30-50% (FFR 0.93), LM/LCx/RCA w/ minor disease, EDP 7, access R radial.  Pt remained asymptomatic, HD stable, VSS, labs and telemetry reviewed and pt stable for discharge as discussed with Dr. Mccoy. Pt has received appropriate discharge instructions, including medication regimen, access site management and follow up with Dr. Keenan in 1-2 weeks. 78M, Kyrgyz speaking, former smoker, w/ PMHx of HTN, HLD, RBBB, PVCs, and Bladder CA (s/p resection 6/2023, pending further monitoring prior to possible chemo/radiation), was referred to [facility] ED by outpt cardiologist, Dr. Keenan, for abnormal outpt CCTA and worsening exertional substernal chest tightness that progressed to rest w/ associated SOB/dizziness/fatigue. In office, CCTA revealed Ca score 381, p/mLAD multifocal calcified and soft plaques w/ mod stenosis (50-69%) and TTE revealed normal LVEF w/o WMA, mild AI.   In ED, VSS, labs significant for hsTropT 7, CK/CKMB negative. EKG: SR, RBBB, 1st degree AVB, non ischemic. CXR: left hilar asymmetry/mass, cardiomegaly and left epipericardial fat pad, recommending CT chest. Pt was admitted to cardiac telemetry for further management of unstable angina. Pt now s/p dx cardiac cath revealing pLAD 30-50% (FFR 0.93), LM/LCx/RCA w/ minor disease, EDP 7, access R radial.  Pt remained asymptomatic, HD stable, VSS, labs and telemetry reviewed and pt stable for discharge as discussed with Dr. Mccoy. Pt has received appropriate discharge instructions, including medication regimen, access site management and follow up with Dr. Keenan in 1-2 weeks.

## 2023-09-08 NOTE — H&P ADULT - NSICDXPASTMEDICALHX_GEN_ALL_CORE_FT
PAST MEDICAL HISTORY:  Bladder cancer     History of right bundle branch block (RBBB)     HLD (hyperlipidemia)     HTN (hypertension)

## 2023-09-08 NOTE — ED PROVIDER NOTE - OBJECTIVE STATEMENT
79 yo M Serbian speaker send by cardiology for admission for cath due to new REYES x 1-2 weeks and outpt + CCTA showing obstructive lesion.  Pt denies current symptoms. 79 yo M Welsh speaker send by cardiology for admission for cath due to new REYES x 1-2 weeks and outpt + CCTA showing obstructive lesion.  Pt denies current symptoms. 79 yo M Estonian speaker send by cardiology for admission for cath due to new REYES x 1-2 weeks and outpt + CCTA showing obstructive lesion.  Pt denies current symptoms.

## 2023-09-08 NOTE — DISCHARGE NOTE PROVIDER - NSDCMRMEDTOKEN_GEN_ALL_CORE_FT
amLODIPine 5 mg oral tablet: 1 tab(s) orally once a day  Aspirin Enteric Coated 81 mg oral delayed release tablet: 1 tab(s) orally once a day  atorvastatin 20 mg oral tablet: 1 tab(s) orally once a day  losartan 100 mg oral tablet: 1 tab(s) orally once a day

## 2023-09-08 NOTE — H&P ADULT - NSHPLABSRESULTS_GEN_ALL_CORE
13.5   4.92  )-----------( 134      ( 08 Sep 2023 07:48 )             39.3       09-08    135  |  101  |  11  ----------------------------<  145<H>  3.8   |  25  |  0.68    Ca    9.0      08 Sep 2023 07:48    TPro  7.0  /  Alb  4.1  /  TBili  0.4  /  DBili  x   /  AST  54<H>  /  ALT  45  /  AlkPhos  57  09-08                Urinalysis Basic - ( 08 Sep 2023 07:48 )    Color: x / Appearance: x / SG: x / pH: x  Gluc: 145 mg/dL / Ketone: x  / Bili: x / Urobili: x   Blood: x / Protein: x / Nitrite: x   Leuk Esterase: x / RBC: x / WBC x   Sq Epi: x / Non Sq Epi: x / Bacteria: x        EKG: 13.5   4.92  )-----------( 134      ( 08 Sep 2023 07:48 )             39.3       09-08    135  |  101  |  11  ----------------------------<  145<H>  3.8   |  25  |  0.68    Ca    9.0      08 Sep 2023 07:48    TPro  7.0  /  Alb  4.1  /  TBili  0.4  /  DBili  x   /  AST  54<H>  /  ALT  45  /  AlkPhos  57  09-08                Urinalysis Basic - ( 08 Sep 2023 07:48 )    Color: x / Appearance: x / SG: x / pH: x  Gluc: 145 mg/dL / Ketone: x  / Bili: x / Urobili: x   Blood: x / Protein: x / Nitrite: x   Leuk Esterase: x / RBC: x / WBC x   Sq Epi: x / Non Sq Epi: x / Bacteria: x      EKG: SR, RBBB, 1st degree AVB, non ischemic      Xray Chest 1 View- PORTABLE-Urgent (09.08.23 @ 08:15): Left hilar asymmetry/mass. Recommend chest CT. Cardiomegaly/cardiac magnification, left epipericardial fat pad. Thoracic spine degenerative changes. 13.5   4.92  )-----------( 134      ( 08 Sep 2023 07:48 )             39.3       09-08    135  |  101  |  11  ----------------------------<  145<H>  3.8   |  25  |  0.68    Ca    9.0      08 Sep 2023 07:48    TPro  7.0  /  Alb  4.1  /  TBili  0.4  /  DBili  x   /  AST  54<H>  /  ALT  45  /  AlkPhos  57  09-08      PT/INR - ( 08 Sep 2023 08:41 )   PT: 10.7 sec;   INR: 0.94          PTT - ( 08 Sep 2023 08:41 )  PTT:31.1 sec    CARDIAC MARKERS ( 08 Sep 2023 07:48 )  x     / x     / 104 U/L / x     / 2.4 ng/mL        Urinalysis Basic - ( 08 Sep 2023 07:48 )    Color: x / Appearance: x / SG: x / pH: x  Gluc: 145 mg/dL / Ketone: x  / Bili: x / Urobili: x   Blood: x / Protein: x / Nitrite: x   Leuk Esterase: x / RBC: x / WBC x   Sq Epi: x / Non Sq Epi: x / Bacteria: x        EKG: SR, RBBB, 1st degree AVB, non ischemic      Xray Chest 1 View- PORTABLE-Urgent (09.08.23 @ 08:15): Left hilar asymmetry/mass. Recommend chest CT. Cardiomegaly/cardiac magnification, left epipericardial fat pad. Thoracic spine degenerative changes.

## 2023-09-08 NOTE — H&P ADULT - HISTORY OF PRESENT ILLNESS
78M, Occitan speaking, w/ PMHx of HTN, HLD, RBBB, PVCs, and Bladder CA s/p surgery (2023), was referred to [facility] ED by outpt cardiologist, Dr. Keenan, for worsening CP and abnormal outpt CCTA. Pt reports exertional CP that has now progressed to at rest, w/ associated SOB x 2 months. He denies any ___ palpitations, dizziness/lightheadedness, syncope, fatigue, diaphoresis, orthopnea, PND, LE edema, N/V, abd pain, fever, chills or recent sick contact. Per cardiologist, CCTA revealed mod-severe stenosis in LAD.    In ED, VSS: /76, HR 80bpm, T 97.8, RR 18, SpO2 97% RA. Labs significant for hsTropT 7, CK/CKMB ___, K 3.8, EKG: ___. CXR: left hilar asymmetry/mass, cardiomegaly and left epipericardial fat pad.    Pt now admitted to cardiac telemetry for further management of unstable angina w/ plan for cardiac catheterization today. 78M, Lithuanian speaking, w/ PMHx of HTN, HLD, RBBB, PVCs, and Bladder CA s/p surgery (2023), was referred to [facility] ED by outpt cardiologist, Dr. Keenan, for worsening CP and abnormal outpt CCTA. Pt reports exertional CP that has now progressed to at rest, w/ associated SOB x 2 months. He denies any ___ palpitations, dizziness/lightheadedness, syncope, fatigue, diaphoresis, orthopnea, PND, LE edema, N/V, abd pain, fever, chills or recent sick contact. Per cardiologist, CCTA revealed mod-severe stenosis in LAD.    In ED, VSS: /76, HR 80bpm, T 97.8, RR 18, SpO2 97% RA. Labs significant for hsTropT 7, CK/CKMB ___, K 3.8, EKG: ___. CXR: left hilar asymmetry/mass, cardiomegaly and left epipericardial fat pad.    Pt now admitted to cardiac telemetry for further management of unstable angina w/ plan for cardiac catheterization today. 78M, Italian speaking, w/ PMHx of HTN, HLD, RBBB, PVCs, and Bladder CA s/p surgery (2023), was referred to [facility] ED by outpt cardiologist, Dr. Keenan, for worsening CP and abnormal outpt CCTA. Pt reports exertional CP that has now progressed to at rest, w/ associated SOB x 2 months. He denies any ___ palpitations, dizziness/lightheadedness, syncope, fatigue, diaphoresis, orthopnea, PND, LE edema, N/V, abd pain, fever, chills or recent sick contact. Per cardiologist, CCTA revealed mod-severe stenosis in LAD.    In ED, VSS: /76, HR 80bpm, T 97.8, RR 18, SpO2 97% RA. Labs significant for hsTropT 7, CK/CKMB ___, K 3.8, EKG: ___. CXR: left hilar asymmetry/mass, cardiomegaly and left epipericardial fat pad.    Pt now admitted to cardiac telemetry for further management of unstable angina w/ plan for cardiac catheterization today. 78M, Greek speaking, w/ PMHx of HTN, HLD, RBBB, PVCs, and Bladder CA s/p surgery (2023), was referred to [facility] ED by outpt cardiologist, Dr. Keenan, for worsening CP and abnormal outpt CCTA. Pt reports exertional CP that has now progressed to at rest, w/ associated SOB x 2 months. He denies any ___ palpitations, dizziness/lightheadedness, syncope, fatigue, diaphoresis, orthopnea, PND, LE edema, N/V, abd pain, fever, chills or recent sick contact. Per cardiologist, CCTA revealed mod-severe stenosis in LAD.    In ED, VSS: /76, HR 80bpm, T 97.8, RR 18, SpO2 97% RA. Labs significant for hsTropT 7, CK/CKMB ___, K 3.8, EKG: SR, RBBB, 1st degree AVB, non ischemic. CXR: left hilar asymmetry/mass, cardiomegaly and left epipericardial fat pad.    Pt now admitted to cardiac telemetry for further management of unstable angina w/ plan for cardiac catheterization today. 78M, Bengali speaking, w/ PMHx of HTN, HLD, RBBB, PVCs, and Bladder CA s/p surgery (2023), was referred to [facility] ED by outpt cardiologist, Dr. Keenan, for worsening CP and abnormal outpt CCTA. Pt reports exertional CP that has now progressed to at rest, w/ associated SOB x 2 months. He denies any ___ palpitations, dizziness/lightheadedness, syncope, fatigue, diaphoresis, orthopnea, PND, LE edema, N/V, abd pain, fever, chills or recent sick contact. Per cardiologist, CCTA revealed mod-severe stenosis in LAD.    In ED, VSS: /76, HR 80bpm, T 97.8, RR 18, SpO2 97% RA. Labs significant for hsTropT 7, CK/CKMB ___, K 3.8, EKG: SR, RBBB, 1st degree AVB, non ischemic. CXR: left hilar asymmetry/mass, cardiomegaly and left epipericardial fat pad.    Pt now admitted to cardiac telemetry for further management of unstable angina w/ plan for cardiac catheterization today. 78M, English speaking, w/ PMHx of HTN, HLD, RBBB, PVCs, and Bladder CA s/p surgery (2023), was referred to [facility] ED by outpt cardiologist, Dr. Keenan, for worsening CP and abnormal outpt CCTA. Pt reports exertional CP that has now progressed to at rest, w/ associated SOB x 2 months. He denies any ___ palpitations, dizziness/lightheadedness, syncope, fatigue, diaphoresis, orthopnea, PND, LE edema, N/V, abd pain, fever, chills or recent sick contact. Per cardiologist, CCTA revealed mod-severe stenosis in LAD.    In ED, VSS: /76, HR 80bpm, T 97.8, RR 18, SpO2 97% RA. Labs significant for hsTropT 7, CK/CKMB ___, K 3.8, EKG: SR, RBBB, 1st degree AVB, non ischemic. CXR: left hilar asymmetry/mass, cardiomegaly and left epipericardial fat pad.    Pt now admitted to cardiac telemetry for further management of unstable angina w/ plan for cardiac catheterization today. 78M, Indonesian speaking, w/ PMHx of HTN, HLD, RBBB, PVCs, and Bladder CA s/p surgery (2023), was referred to [facility] ED by outpt cardiologist, Dr. Keenan, for worsening CP and abnormal outpt CCTA. Pt reports exertional CP that has now progressed to at rest, w/ associated SOB x 2 months. He denies any ___ palpitations, dizziness/lightheadedness, syncope, fatigue, diaphoresis, orthopnea, PND, LE edema, N/V, abd pain, fever, chills or recent sick contact. Per cardiologist, CCTA revealed mod-severe stenosis in LAD.    In ED, VSS: /76, HR 80bpm, T 97.8, RR 18, SpO2 97% RA. Labs significant for hsTropT 7, CK/CKMB negative, K 3.8, EKG: SR, RBBB, 1st degree AVB, non ischemic. CXR: left hilar asymmetry/mass, cardiomegaly and left epipericardial fat pad.    Pt now admitted to cardiac telemetry for further management of unstable angina w/ plan for cardiac catheterization today. 78M, Swedish speaking, w/ PMHx of HTN, HLD, RBBB, PVCs, and Bladder CA s/p surgery (2023), was referred to [facility] ED by outpt cardiologist, Dr. Keenan, for worsening CP and abnormal outpt CCTA. Pt reports exertional CP that has now progressed to at rest, w/ associated SOB x 2 months. He denies any ___ palpitations, dizziness/lightheadedness, syncope, fatigue, diaphoresis, orthopnea, PND, LE edema, N/V, abd pain, fever, chills or recent sick contact. Per cardiologist, CCTA revealed mod-severe stenosis in LAD.    In ED, VSS: /76, HR 80bpm, T 97.8, RR 18, SpO2 97% RA. Labs significant for hsTropT 7, CK/CKMB negative, K 3.8, EKG: SR, RBBB, 1st degree AVB, non ischemic. CXR: left hilar asymmetry/mass, cardiomegaly and left epipericardial fat pad.    Pt now admitted to cardiac telemetry for further management of unstable angina w/ plan for cardiac catheterization today. 78M, Tajik speaking, w/ PMHx of HTN, HLD, RBBB, PVCs, and Bladder CA s/p surgery (2023), was referred to [facility] ED by outpt cardiologist, Dr. Keenan, for worsening CP and abnormal outpt CCTA. Pt reports exertional CP that has now progressed to at rest, w/ associated SOB x 2 months. He denies any ___ palpitations, dizziness/lightheadedness, syncope, fatigue, diaphoresis, orthopnea, PND, LE edema, N/V, abd pain, fever, chills or recent sick contact. Per cardiologist, CCTA revealed mod-severe stenosis in LAD.    In ED, VSS: /76, HR 80bpm, T 97.8, RR 18, SpO2 97% RA. Labs significant for hsTropT 7, CK/CKMB negative, K 3.8, EKG: SR, RBBB, 1st degree AVB, non ischemic. CXR: left hilar asymmetry/mass, cardiomegaly and left epipericardial fat pad.    Pt now admitted to cardiac telemetry for further management of unstable angina w/ plan for cardiac catheterization today. 78M, Yoruba speaking, former smoker, w/ PMHx of HTN, HLD, RBBB, PVCs, and Bladder CA (s/p resection 6/2023, pending further monitoring prior to possible chemo/radiation), was referred to [facility] ED by outpt cardiologist, Dr. Keenan, for worsening CP and abnormal outpt CCTA. Pt reports exertional substernal chest tightness x 2-3 months that has now progressed to at rest. He endorses associated SOB, fatigue and dizziness. He denies any palpitations, syncope, diaphoresis, orthopnea, PND, LE edema, N/V, abd pain, fever, chills or recent sick contact. Outpt CCTA 8/15/23 revealed Ca score 381, p/mLAD multifocal calcified and soft plaques w/ mod stenosis (50-69%). Outpt TTE 7/19/23 revealed normal LVEF, mild AI.    In ED, VSS: /76, HR 80bpm, T 97.8, RR 18, SpO2 97% RA. Labs significant for hsTropT 7, CK/CKMB negative, K 3.8, EKG: SR, RBBB, 1st degree AVB, non ischemic. CXR: left hilar asymmetry/mass, cardiomegaly and left epipericardial fat pad.    Pt now admitted to cardiac telemetry for further management of unstable angina w/ plan for cardiac catheterization today. 78M, French speaking, former smoker, w/ PMHx of HTN, HLD, RBBB, PVCs, and Bladder CA (s/p resection 6/2023, pending further monitoring prior to possible chemo/radiation), was referred to [facility] ED by outpt cardiologist, Dr. Keenan, for worsening CP and abnormal outpt CCTA. Pt reports exertional substernal chest tightness x 2-3 months that has now progressed to at rest. He endorses associated SOB, fatigue and dizziness. He denies any palpitations, syncope, diaphoresis, orthopnea, PND, LE edema, N/V, abd pain, fever, chills or recent sick contact. Outpt CCTA 8/15/23 revealed Ca score 381, p/mLAD multifocal calcified and soft plaques w/ mod stenosis (50-69%). Outpt TTE 7/19/23 revealed normal LVEF, mild AI.    In ED, VSS: /76, HR 80bpm, T 97.8, RR 18, SpO2 97% RA. Labs significant for hsTropT 7, CK/CKMB negative, K 3.8, EKG: SR, RBBB, 1st degree AVB, non ischemic. CXR: left hilar asymmetry/mass, cardiomegaly and left epipericardial fat pad.    Pt now admitted to cardiac telemetry for further management of unstable angina w/ plan for cardiac catheterization today. 78M, Danish speaking, former smoker, w/ PMHx of HTN, HLD, RBBB, PVCs, and Bladder CA (s/p resection 6/2023, pending further monitoring prior to possible chemo/radiation), was referred to [facility] ED by outpt cardiologist, Dr. Keenan, for worsening CP and abnormal outpt CCTA. Pt reports exertional substernal chest tightness x 2-3 months that has now progressed to at rest. He endorses associated SOB, fatigue and dizziness. He denies any palpitations, syncope, diaphoresis, orthopnea, PND, LE edema, N/V, abd pain, fever, chills or recent sick contact. Outpt CCTA 8/15/23 revealed Ca score 381, p/mLAD multifocal calcified and soft plaques w/ mod stenosis (50-69%). Outpt TTE 7/19/23 revealed normal LVEF, mild AI.    In ED, VSS: /76, HR 80bpm, T 97.8, RR 18, SpO2 97% RA. Labs significant for hsTropT 7, CK/CKMB negative, K 3.8, EKG: SR, RBBB, 1st degree AVB, non ischemic. CXR: left hilar asymmetry/mass, cardiomegaly and left epipericardial fat pad.    Pt now admitted to cardiac telemetry for further management of unstable angina w/ plan for cardiac catheterization today. 78M, Chinese speaking, former smoker, w/ PMHx of HTN, HLD, RBBB, PVCs, and Bladder CA (s/p resection 6/2023, pending further monitoring prior to possible chemo/radiation), was referred to [facility] ED by outpt cardiologist, Dr. Keenan, for worsening CP and abnormal outpt CCTA. Pt reports exertional substernal chest tightness x 2-3 months that has now progressed to at rest. He endorses associated SOB, fatigue and dizziness. He denies any palpitations, syncope, diaphoresis, orthopnea, PND, LE edema, N/V, abd pain, fever, chills or recent sick contact. In office, CCTA revealed Ca score 381, p/mLAD multifocal calcified and soft plaques w/ mod stenosis (50-69%) and TTE revealed normal LVEF w/o WMA, mild AI.    In ED, VSS: /76, HR 80bpm, T 97.8, RR 18, SpO2 97% RA. Labs significant for hsTropT 7, CK/CKMB negative, K 3.8, EKG: SR, RBBB, 1st degree AVB, non ischemic. CXR: left hilar asymmetry/mass, cardiomegaly and left epipericardial fat pad.    Pt now admitted to cardiac telemetry for further management of unstable angina w/ plan for cardiac catheterization today. 78M, Setswana speaking, former smoker, w/ PMHx of HTN, HLD, RBBB, PVCs, and Bladder CA (s/p resection 6/2023, pending further monitoring prior to possible chemo/radiation), was referred to [facility] ED by outpt cardiologist, Dr. Keenan, for worsening CP and abnormal outpt CCTA. Pt reports exertional substernal chest tightness x 2-3 months that has now progressed to at rest. He endorses associated SOB, fatigue and dizziness. He denies any palpitations, syncope, diaphoresis, orthopnea, PND, LE edema, N/V, abd pain, fever, chills or recent sick contact. In office, CCTA revealed Ca score 381, p/mLAD multifocal calcified and soft plaques w/ mod stenosis (50-69%) and TTE revealed normal LVEF w/o WMA, mild AI.    In ED, VSS: /76, HR 80bpm, T 97.8, RR 18, SpO2 97% RA. Labs significant for hsTropT 7, CK/CKMB negative, K 3.8, EKG: SR, RBBB, 1st degree AVB, non ischemic. CXR: left hilar asymmetry/mass, cardiomegaly and left epipericardial fat pad.    Pt now admitted to cardiac telemetry for further management of unstable angina w/ plan for cardiac catheterization today. 78M, Vietnamese speaking, former smoker, w/ PMHx of HTN, HLD, RBBB, PVCs, and Bladder CA (s/p resection 6/2023, pending further monitoring prior to possible chemo/radiation), was referred to [facility] ED by outpt cardiologist, Dr. Keenan, for worsening CP and abnormal outpt CCTA. Pt reports exertional substernal chest tightness x 2-3 months that has now progressed to at rest. He endorses associated SOB, fatigue and dizziness. He denies any palpitations, syncope, diaphoresis, orthopnea, PND, LE edema, N/V, abd pain, fever, chills or recent sick contact. In office, CCTA revealed Ca score 381, p/mLAD multifocal calcified and soft plaques w/ mod stenosis (50-69%) and TTE revealed normal LVEF w/o WMA, mild AI.    In ED, VSS: /76, HR 80bpm, T 97.8, RR 18, SpO2 97% RA. Labs significant for hsTropT 7, CK/CKMB negative, K 3.8, EKG: SR, RBBB, 1st degree AVB, non ischemic. CXR: left hilar asymmetry/mass, cardiomegaly and left epipericardial fat pad.    Pt now admitted to cardiac telemetry for further management of unstable angina w/ plan for cardiac catheterization today. 78M, Icelandic speaking, former smoker, w/ PMHx of HTN, HLD, RBBB, PVCs, and Bladder CA (s/p resection 6/2023, pending further monitoring prior to possible chemo/radiation), was referred to [facility] ED by outpt cardiologist, Dr. Keenan, for worsening CP and abnormal outpt CCTA. Pt reports exertional substernal chest tightness x 2-3 months that has now progressed to at rest. He endorses associated SOB, fatigue and dizziness. He denies any palpitations, syncope, diaphoresis, orthopnea, PND, LE edema, N/V, abd pain, fever, chills or recent sick contact.    In office, CCTA revealed Ca score 381, p/mLAD multifocal calcified and soft plaques w/ mod stenosis (50-69%) and TTE revealed normal LVEF w/o WMA, mild AI.    In ED, VSS: /76, HR 80bpm, T 97.8, RR 18, SpO2 97% RA. Labs significant for hsTropT 7, CK/CKMB negative, K 3.8, EKG: SR, RBBB, 1st degree AVB, non ischemic. CXR: left hilar asymmetry/mass, cardiomegaly and left epipericardial fat pad.    Pt now admitted to cardiac telemetry for further management of unstable angina w/ plan for cardiac catheterization today. 78M, Amharic speaking, former smoker, w/ PMHx of HTN, HLD, RBBB, PVCs, and Bladder CA (s/p resection 6/2023, pending further monitoring prior to possible chemo/radiation), was referred to [facility] ED by outpt cardiologist, Dr. Keenan, for worsening CP and abnormal outpt CCTA. Pt reports exertional substernal chest tightness x 2-3 months that has now progressed to at rest. He endorses associated SOB, fatigue and dizziness. He denies any palpitations, syncope, diaphoresis, orthopnea, PND, LE edema, N/V, abd pain, fever, chills or recent sick contact.    In office, CCTA revealed Ca score 381, p/mLAD multifocal calcified and soft plaques w/ mod stenosis (50-69%) and TTE revealed normal LVEF w/o WMA, mild AI.    In ED, VSS: /76, HR 80bpm, T 97.8, RR 18, SpO2 97% RA. Labs significant for hsTropT 7, CK/CKMB negative, K 3.8, EKG: SR, RBBB, 1st degree AVB, non ischemic. CXR: left hilar asymmetry/mass, cardiomegaly and left epipericardial fat pad.    Pt now admitted to cardiac telemetry for further management of unstable angina w/ plan for cardiac catheterization today. 78M, Faroese speaking, former smoker, w/ PMHx of HTN, HLD, RBBB, PVCs, and Bladder CA (s/p resection 6/2023, pending further monitoring prior to possible chemo/radiation), was referred to [facility] ED by outpt cardiologist, Dr. Keenan, for worsening CP and abnormal outpt CCTA. Pt reports exertional substernal chest tightness x 2-3 months that has now progressed to at rest. He endorses associated SOB, fatigue and dizziness. He denies any palpitations, syncope, diaphoresis, orthopnea, PND, LE edema, N/V, abd pain, fever, chills or recent sick contact.    In office, CCTA revealed Ca score 381, p/mLAD multifocal calcified and soft plaques w/ mod stenosis (50-69%) and TTE revealed normal LVEF w/o WMA, mild AI.    In ED, VSS: /76, HR 80bpm, T 97.8, RR 18, SpO2 97% RA. Labs significant for hsTropT 7, CK/CKMB negative, K 3.8, EKG: SR, RBBB, 1st degree AVB, non ischemic. CXR: left hilar asymmetry/mass, cardiomegaly and left epipericardial fat pad.    Pt now admitted to cardiac telemetry for further management of unstable angina w/ plan for cardiac catheterization today.

## 2023-09-08 NOTE — H&P ADULT - PROBLEM SELECTOR PLAN 4
s/p surgery, currently in ?? remission ??  -Continue Finasteride 5mg QD  -CXR revealing L hilar asymmetry/mass and recommending CT, f/u as outpt    F: None  E: Replete if K<4 or Mag<2  N: DASH Diet  VTEppx: None 2/2 cath  Dispo: cardiac tele s/p surgery, currently in ?? remission ??  -Continue Finasteride 5mg QD  -CXR revealing L hilar asymmetry/mass and recommending CT, f/u as outpt    F: NS 250cc bolus > 75cc/hr  E: Replete if K<4 or Mag<2  N: NPO  VTEppx: None 2/2 cath  Dispo: cardiac tele s/p surgery, currently in ?? remission ??  -CXR revealing L hilar asymmetry/mass and recommending CT, f/u as outpt    F: NS 250cc bolus > 75cc/hr  E: Replete if K<4 or Mag<2  N: NPO  VTEppx: None 2/2 cath  Dispo: cardiac tele newly dx Bladder CA s/p resection 6/2023, pending further monitoring prior to possible chemo/radiation  -CXR revealing L hilar asymmetry/mass and recommending CT, f/u as outpt    F: NS 250cc bolus > 75cc/hr  E: Replete if K<4 or Mag<2  N: NPO  VTEppx: None 2/2 cath  Dispo: cardiac tele

## 2023-09-08 NOTE — DISCHARGE NOTE PROVIDER - CARE PROVIDERS DIRECT ADDRESSES
[email or web address] ,[email or web address]

## 2023-09-08 NOTE — H&P ADULT - CARDIOVASCULAR
normal/regular rate and rhythm/S1 S2 present/no gallops/no rub/no murmur/no JVD/normal PMI/no pedal edema/vascular

## 2023-09-08 NOTE — ED PROVIDER NOTE - CLINICAL SUMMARY MEDICAL DECISION MAKING FREE TEXT BOX
SS noted above concerning for unstable angina.  Currently symptom free.  Prior testing discussed.  Cardiology aware.  VSS.  Plan labs, cxr, ekg, admit to cath.  Cath team to load after admission.  Used family to help translate.

## 2023-09-08 NOTE — H&P ADULT - ASSESSMENT
78M, Macedonian speaking, w/ PMHx of HTN, HLD, RBBB, PVCs, and Bladder CA s/p surgery (2023), was referred to [facility] ED by outpt cardiologist for worsening CP and abnormal outpt CCTA, pt now admitted to cardiac tele for further management of unstable angina w/ plan for cardiac cath today. 78M, Macedonian speaking, w/ PMHx of HTN, HLD, RBBB, PVCs, and Bladder CA s/p surgery (2023), was referred to [facility] ED by outpt cardiologist for worsening CP and abnormal outpt CCTA, pt now admitted to cardiac tele for further management of unstable angina w/ plan for cardiac cath today. 78M, Faroese speaking, w/ PMHx of HTN, HLD, RBBB, PVCs, and Bladder CA s/p surgery (2023), was referred to [facility] ED by outpt cardiologist for worsening CP and abnormal outpt CCTA, pt now admitted to cardiac tele for further management of unstable angina w/ plan for cardiac cath today. 78M, Lithuanian speaking, former smoker, w/ PMHx of HTN, HLD, RBBB, PVCs, and Bladder CA (s/p resection 6/2023, pending further monitoring prior to possible chemo/radiation), was referred to [facility] ED by outpt cardiologist for worsening CP and abnormal outpt CCTA, pt now admitted to cardiac tele for further management of unstable angina w/ plan for cardiac cath today. 78M, Lithuanian speaking, former smoker, w/ PMHx of HTN, HLD, RBBB, PVCs, and Bladder CA (s/p resection 6/2023, pending further monitoring prior to possible chemo/radiation), was referred to [facility] ED by outpt cardiologist for worsening CP and abnormal outpt CCTA, pt now admitted to cardiac tele for further management of unstable angina w/ plan for cardiac cath today. 78M, Frisian speaking, former smoker, w/ PMHx of HTN, HLD, RBBB, PVCs, and Bladder CA (s/p resection 6/2023, pending further monitoring prior to possible chemo/radiation), was referred to [facility] ED by outpt cardiologist for worsening CP and abnormal outpt CCTA, pt now admitted to cardiac tele for further management of unstable angina w/ plan for cardiac cath today.

## 2023-09-08 NOTE — ED ADULT NURSE NOTE - OBJECTIVE STATEMENT
Pt. 78y M pmhx CAD c/o Intermittent chest pain, SOB, dizziness for 2 months. as per doctor's notes, pt. has moderate to severe stenosis in the LAD. he was scheduled for cardiac catheterization but did not go. he returned with worsening chest pain and sob. Denies n/v/d, urinary symptoms, fever or chills.

## 2023-09-08 NOTE — DISCHARGE NOTE PROVIDER - NSDCCPCAREPLAN_GEN_ALL_CORE_FT
PRINCIPAL DISCHARGE DIAGNOSIS  Diagnosis: Unstable angina  Assessment and Plan of Treatment: You came to the hospital for evaluation of your chest pain, which has since then resolved. You had cardiac enzymes which were negative, revealing no damage to the heart muscle, or a heart attack.   You underwent a cardiac catheterization on 9/8/23 andrevealed no major blockages in the arteries of your heart.  -Please continue Aspirin 81mg daily and Atorvastatin 80mg at bedtime to prevent the plaque from increasing.  -Avoid strenuous activity or heavy lifting anything more than 5lbs for the next five days.   -Do not take a bath or swim for the next five days; you may shower. For any bleeding or hematoma formation (hardened blood collection under the skin) at the access site of your right wrist please hold pressure and go to the emergency room. Please follow up with Dr. Keenan  in 1-2 weeks. For recurrent chest pain, please call your doctor or go to the emergency room.

## 2023-09-08 NOTE — ED ADULT TRIAGE NOTE - CHIEF COMPLAINT QUOTE
Intermittent chest pain, SOB, dizziness for 2 months. as per doctor's notes, pt. has moderate to severe stenosis in the LAD. he was scheduled for cardiac catheterization but did not go. he returned with worsening chest pain and sob.

## 2023-09-08 NOTE — DISCHARGE NOTE NURSING/CASE MANAGEMENT/SOCIAL WORK - PATIENT PORTAL LINK FT
You can access the FollowMyHealth Patient Portal offered by [facility] by registering at the following website: [email or web address]. By joining [employer]’s FollowMyHealth portal, you will also be able to view your health information using other applications (apps) compatible with our system. You can access the FollowMyHealth Patient Portal offered by [facility] by registering at the following website: [email or web address]. By joining [employer]’s FollowMyHealth portal, you will also be able to view your health information using other applications (apps) compatible with our system. You can access the FollowMyHealth Patient Portal offered by [facility] by registering at the following website: [email or web address]. By joining [employer]’s FollowMyHealth portal, you will also be able to view your health information using other applications (apps) compatible with our system.

## 2023-09-08 NOTE — ED ADULT NURSE NOTE - NSFALLUNIVINTERV_ED_ALL_ED
Bed/Stretcher in lowest position, wheels locked, appropriate side rails in place/Call bell, personal items and telephone in reach/Instruct patient to call for assistance before getting out of bed/chair/stretcher/Non-slip footwear applied when patient is off stretcher/[place] to call system/Physically safe environment - no spills, clutter or unnecessary equipment/Purposeful proactive rounding/Room/bathroom lighting operational, light cord in reach Bed/Stretcher in lowest position, wheels locked, appropriate side rails in place/Call bell, personal items and telephone in reach/Instruct patient to call for assistance before getting out of bed/chair/stretcher/Non-slip footwear applied when patient is off stretcher/[place] to call system/Physically safe environment - no spills, clutter or unnecessary equipment/Purposeful proactive rounding/Room/bathroom lighting operational, light cord in reach Bed/Stretcher in lowest position, wheels locked, appropriate side rails in place/Call bell, personal items and telephone in reach/Instruct patient to call for assistance before getting out of bed/chair/stretcher/Non-slip footwear applied when patient is off stretcher/[place] to call system/Physically safe environment - no spills, clutter or unnecessary equipment/Purposeful proactive rounding/Room/bathroom lighting operational, light cord in reach

## 2023-09-08 NOTE — ED PROVIDER NOTE - MUSCULOSKELETAL, MLM
All bony prominences palpated and nontender.  Range of motion is not limited, no muscle or joint tenderness.  All soft tissue compartments palpated, normal, nontender without tension.  Bilateral radial pulses are normal, 2+ and symmetrical.  Bilateral Femoral/DP/PT pulses are normal 2+, and symmetrical.  No lower extremity edema or calf tenderness appreciated.  Negative Harleen's sign bilaterally.

## 2023-09-08 NOTE — DISCHARGE NOTE NURSING/CASE MANAGEMENT/SOCIAL WORK - NSDCPEFALRISK_GEN_ALL_CORE
For information on Fall & Injury Prevention, visit: [email or web address]-[email or web address]-mobility OR  [email or web address]-[email or web address]-injury OR  https://www.cdc.gov/steadi/patient.html For information on Fall & Injury Prevention, visit: [email or web address]-[email or web address]-mobility OR  [email or web address]-[email or web address]-injury OR  https://www.cdc.gov/steadi/patient.html For information on Fall & Injury Prevention, visit: [email or web address]://[email or web address]/news/fall-[email or web address]-protects-[email or web address]-maintains-health-and-mobility OR  https://[email or web address]/news/fall-prevention-tips-to-avoid-injury OR  https://www.cdc.gov/steadi/patient.html

## 2023-09-08 NOTE — H&P ADULT - PROBLEM SELECTOR PLAN 1
presents w/ worsening exertional CP now progressed to at rest and associated SOB, pt currently CP free and HD stable  -hsTrop T 7, CK/CKMB ___, f/u repeat CE  -EKG:  -TTE ordered  -outpt CCTA revealed mod-severe LAD stenosis  -NPO for cardiac cath today, pt consented and loaded w/ PLavix 600mg, no ASA load required 2/2 compliance at home, pre cath IVF 250cc NS bolus followed by maintanence 75cc/hr   -Continue ASA 81mg QD, Lipitor 20mg HS, Losartan 100mg QD and Norvasc 5mg QD presents w/ worsening exertional CP now progressed to at rest and associated SOB, pt currently CP free and HD stable  -hsTrop T 7, CK/CKMB ___, f/u repeat CE  -EKG: SR, RBBB, 1st degree AVB, non ischemic  -TTE ordered  -outpt CCTA revealed mod-severe LAD stenosis  -NPO for cardiac cath today, pt consented and loaded w/ Plavix 600mg, No ASA load required 2/2 compliance at home. Pre cath IVF 250cc NS bolus followed by maintenance 75cc/hr   -Continue ASA 81mg QD, Lipitor 20mg HS, Losartan 100mg QD and Norvasc 5mg QD presents w/ worsening exertional CP now progressed to at rest and associated SOB, pt currently CP free and HD stable  -hsTrop T 7, CK/CKMB negative, f/u repeat CE  -EKG: SR, RBBB, 1st degree AVB, non ischemic  -TTE ordered  -outpt CCTA revealed mod-severe LAD stenosis  -NPO for cardiac cath today, pt consented and loaded w/ Plavix 600mg, No ASA load required 2/2 compliance at home. Pre cath IVF 250cc NS bolus followed by maintenance 75cc/hr   -Continue ASA 81mg QD, Lipitor 20mg HS, Losartan 100mg QD and Norvasc 5mg QD presents w/ worsening exertional CP now progressed to at rest and associated SOB/dizziness/fatigue, pt currently CP free and HD stable  -hsTrop T 7, CK/CKMB negative, f/u repeat CE  -EKG: SR, RBBB, 1st degree AVB, non ischemic  -TTE 7/19/23: normal LVEF w/o WMA, mild AI  -CCTA 8/15/23: Ca score 381, p/mLAD multifocal calcified and soft plaques w/ mod stenosis (50-69%)  -NPO for cardiac cath today, pt consented and s/p ASA 325mg and Plavix 600mg prior to cath. Pre cath IVF 250cc NS bolus followed by maintenance 75cc/hr   -Continue ASA 81mg QD, Lipitor 20mg HS, Losartan 100mg QD and Norvasc 5mg QD

## 2023-09-12 ENCOUNTER — APPOINTMENT (OUTPATIENT)
Dept: UROLOGY | Facility: CLINIC | Age: 78
End: 2023-09-12

## 2023-09-14 ENCOUNTER — APPOINTMENT (OUTPATIENT)
Dept: UROLOGY | Facility: CLINIC | Age: 78
End: 2023-09-14
Payer: MEDICARE

## 2023-09-14 VITALS
OXYGEN SATURATION: 97 % | HEIGHT: 68 IN | TEMPERATURE: 97.6 F | HEART RATE: 77 BPM | WEIGHT: 174 LBS | SYSTOLIC BLOOD PRESSURE: 130 MMHG | BODY MASS INDEX: 26.37 KG/M2 | DIASTOLIC BLOOD PRESSURE: 78 MMHG

## 2023-09-14 DIAGNOSIS — I49.3 VENTRICULAR PREMATURE DEPOLARIZATION: ICD-10-CM

## 2023-09-14 DIAGNOSIS — E78.5 HYPERLIPIDEMIA, UNSPECIFIED: ICD-10-CM

## 2023-09-14 DIAGNOSIS — Z79.82 LONG TERM (CURRENT) USE OF ASPIRIN: ICD-10-CM

## 2023-09-14 DIAGNOSIS — I45.10 UNSPECIFIED RIGHT BUNDLE-BRANCH BLOCK: ICD-10-CM

## 2023-09-14 DIAGNOSIS — I35.1 NONRHEUMATIC AORTIC (VALVE) INSUFFICIENCY: ICD-10-CM

## 2023-09-14 DIAGNOSIS — I44.0 ATRIOVENTRICULAR BLOCK, FIRST DEGREE: ICD-10-CM

## 2023-09-14 DIAGNOSIS — I10 ESSENTIAL (PRIMARY) HYPERTENSION: ICD-10-CM

## 2023-09-14 DIAGNOSIS — R91.8 OTHER NONSPECIFIC ABNORMAL FINDING OF LUNG FIELD: ICD-10-CM

## 2023-09-14 DIAGNOSIS — I25.84 CORONARY ATHEROSCLEROSIS DUE TO CALCIFIED CORONARY LESION: ICD-10-CM

## 2023-09-14 DIAGNOSIS — Z90.6 ACQUIRED ABSENCE OF OTHER PARTS OF URINARY TRACT: ICD-10-CM

## 2023-09-14 DIAGNOSIS — R07.9 CHEST PAIN, UNSPECIFIED: ICD-10-CM

## 2023-09-14 DIAGNOSIS — I25.110 ATHEROSCLEROTIC HEART DISEASE OF NATIVE CORONARY ARTERY WITH UNSTABLE ANGINA PECTORIS: ICD-10-CM

## 2023-09-14 DIAGNOSIS — C67.9 MALIGNANT NEOPLASM OF BLADDER, UNSPECIFIED: ICD-10-CM

## 2023-09-14 DIAGNOSIS — Z87.891 PERSONAL HISTORY OF NICOTINE DEPENDENCE: ICD-10-CM

## 2023-09-14 PROCEDURE — 99214 OFFICE O/P EST MOD 30 MIN: CPT

## 2023-09-14 RX ORDER — MITOMYCIN 40 MG/80ML
40 INJECTION, POWDER, LYOPHILIZED, FOR SOLUTION INTRAVENOUS
Qty: 1 | Refills: 5 | Status: DISCONTINUED | COMMUNITY
Start: 2023-06-09 | End: 2023-09-14

## 2023-10-02 ENCOUNTER — APPOINTMENT (OUTPATIENT)
Dept: UROLOGY | Facility: CLINIC | Age: 78
End: 2023-10-02
Payer: MEDICARE

## 2023-10-02 VITALS
TEMPERATURE: 98 F | DIASTOLIC BLOOD PRESSURE: 78 MMHG | HEART RATE: 78 BPM | OXYGEN SATURATION: 98 % | SYSTOLIC BLOOD PRESSURE: 139 MMHG

## 2023-10-02 PROCEDURE — 52000 CYSTOURETHROSCOPY: CPT

## 2023-12-21 RX ORDER — AMLODIPINE BESYLATE 2.5 MG/1
1 TABLET ORAL
Refills: 0 | DISCHARGE

## 2023-12-21 RX ORDER — LOSARTAN POTASSIUM 100 MG/1
1 TABLET, FILM COATED ORAL
Refills: 0 | DISCHARGE

## 2023-12-21 RX ORDER — FINASTERIDE 5 MG/1
1 TABLET, FILM COATED ORAL
Refills: 0 | DISCHARGE

## 2023-12-21 RX ORDER — ASPIRIN/CALCIUM CARB/MAGNESIUM 324 MG
1 TABLET ORAL
Refills: 0 | DISCHARGE

## 2023-12-21 RX ORDER — ALLOPURINOL 300 MG
1 TABLET ORAL
Refills: 0 | DISCHARGE

## 2023-12-21 RX ORDER — ATORVASTATIN CALCIUM 80 MG/1
1 TABLET, FILM COATED ORAL
Refills: 0 | DISCHARGE

## 2023-12-26 ENCOUNTER — RX RENEWAL (OUTPATIENT)
Age: 78
End: 2023-12-26

## 2023-12-26 RX ORDER — FINASTERIDE 5 MG/1
5 TABLET, FILM COATED ORAL
Qty: 90 | Refills: 3 | Status: ACTIVE | COMMUNITY
Start: 2023-03-13 | End: 1900-01-01

## 2024-01-04 ENCOUNTER — APPOINTMENT (OUTPATIENT)
Age: 79
End: 2024-01-04
Payer: MEDICARE

## 2024-01-04 VITALS
BODY MASS INDEX: 26.37 KG/M2 | DIASTOLIC BLOOD PRESSURE: 83 MMHG | HEART RATE: 78 BPM | HEIGHT: 68 IN | TEMPERATURE: 98.1 F | WEIGHT: 174 LBS | OXYGEN SATURATION: 99 % | SYSTOLIC BLOOD PRESSURE: 148 MMHG

## 2024-01-04 DIAGNOSIS — Z87.898 PERSONAL HISTORY OF OTHER SPECIFIED CONDITIONS: ICD-10-CM

## 2024-01-04 DIAGNOSIS — R82.89 OTHER ABNRM FNDNGS ON CYTOLOGICAL: ICD-10-CM

## 2024-01-04 DIAGNOSIS — N32.89 OTHER SPECIFIED DISORDERS OF BLADDER: ICD-10-CM

## 2024-01-04 PROBLEM — E78.5 HYPERLIPIDEMIA, UNSPECIFIED: Chronic | Status: ACTIVE | Noted: 2023-09-08

## 2024-01-04 PROBLEM — Z86.79 PERSONAL HISTORY OF OTHER DISEASES OF THE CIRCULATORY SYSTEM: Chronic | Status: ACTIVE | Noted: 2023-09-08

## 2024-01-04 PROBLEM — C67.9 MALIGNANT NEOPLASM OF BLADDER, UNSPECIFIED: Chronic | Status: ACTIVE | Noted: 2023-09-08

## 2024-01-04 PROBLEM — I10 ESSENTIAL (PRIMARY) HYPERTENSION: Chronic | Status: ACTIVE | Noted: 2023-09-08

## 2024-01-04 PROCEDURE — 52000 CYSTOURETHROSCOPY: CPT

## 2024-04-04 ENCOUNTER — APPOINTMENT (OUTPATIENT)
Dept: UROLOGY | Facility: CLINIC | Age: 79
End: 2024-04-04
Payer: MEDICARE

## 2024-04-04 VITALS
OXYGEN SATURATION: 98 % | BODY MASS INDEX: 27.13 KG/M2 | SYSTOLIC BLOOD PRESSURE: 167 MMHG | HEART RATE: 74 BPM | TEMPERATURE: 98.2 F | DIASTOLIC BLOOD PRESSURE: 83 MMHG | WEIGHT: 179 LBS | HEIGHT: 68 IN

## 2024-04-04 DIAGNOSIS — C67.9 MALIGNANT NEOPLASM OF BLADDER, UNSPECIFIED: ICD-10-CM

## 2024-04-04 PROCEDURE — 52000 CYSTOURETHROSCOPY: CPT

## 2024-09-10 ENCOUNTER — OFFICE (OUTPATIENT)
Facility: LOCATION | Age: 79
Setting detail: OPHTHALMOLOGY
End: 2024-09-10
Payer: MEDICARE

## 2024-09-10 ENCOUNTER — RX ONLY (RX ONLY)
Age: 79
End: 2024-09-10

## 2024-09-10 DIAGNOSIS — H43.813: ICD-10-CM

## 2024-09-10 DIAGNOSIS — H35.372: ICD-10-CM

## 2024-09-10 DIAGNOSIS — H16.223: ICD-10-CM

## 2024-09-10 PROCEDURE — 92202 OPSCPY EXTND ON/MAC DRAW: CPT | Performed by: OPTOMETRIST

## 2024-09-10 PROCEDURE — 92134 CPTRZ OPH DX IMG PST SGM RTA: CPT | Performed by: OPTOMETRIST

## 2024-09-10 PROCEDURE — 92014 COMPRE OPH EXAM EST PT 1/>: CPT | Performed by: OPTOMETRIST

## 2024-09-10 ASSESSMENT — CONFRONTATIONAL VISUAL FIELD TEST (CVF)
OS_FINDINGS: FULL
OD_FINDINGS: FULL

## 2024-10-10 ENCOUNTER — APPOINTMENT (OUTPATIENT)
Age: 79
End: 2024-10-10
Payer: MEDICARE

## 2024-10-10 VITALS
DIASTOLIC BLOOD PRESSURE: 79 MMHG | OXYGEN SATURATION: 99 % | TEMPERATURE: 97.1 F | HEIGHT: 68 IN | RESPIRATION RATE: 17 BRPM | BODY MASS INDEX: 27.13 KG/M2 | SYSTOLIC BLOOD PRESSURE: 161 MMHG | WEIGHT: 179 LBS | HEART RATE: 66 BPM

## 2024-10-10 DIAGNOSIS — C67.9 MALIGNANT NEOPLASM OF BLADDER, UNSPECIFIED: ICD-10-CM

## 2024-10-10 PROCEDURE — 52000 CYSTOURETHROSCOPY: CPT

## 2025-01-21 ENCOUNTER — RX RENEWAL (OUTPATIENT)
Age: 80
End: 2025-01-21

## 2025-08-19 ENCOUNTER — OFFICE (OUTPATIENT)
Facility: LOCATION | Age: 80
Setting detail: OPHTHALMOLOGY
End: 2025-08-19
Payer: COMMERCIAL

## 2025-08-19 DIAGNOSIS — H43.813: ICD-10-CM

## 2025-08-19 DIAGNOSIS — H35.373: ICD-10-CM

## 2025-08-19 DIAGNOSIS — H16.223: ICD-10-CM

## 2025-08-19 PROCEDURE — 92134 CPTRZ OPH DX IMG PST SGM RTA: CPT | Performed by: OPTOMETRIST

## 2025-08-19 PROCEDURE — 99213 OFFICE O/P EST LOW 20 MIN: CPT | Performed by: OPTOMETRIST

## 2025-08-19 ASSESSMENT — REFRACTION_AUTOREFRACTION
OS_AXIS: 080
OS_SPHERE: +0.50
OD_AXIS: 094
OD_CYLINDER: -2.00
OD_SPHERE: +1.00
OS_CYLINDER: -1.00

## 2025-08-19 ASSESSMENT — VISUAL ACUITY
OS_BCVA: 20/40 +1
OD_BCVA: 20/30 -2

## 2025-08-19 ASSESSMENT — REFRACTION_MANIFEST
OS_SPHERE: +0.50
OD_AXIS: 094
OD_SPHERE: +1.00
OS_AXIS: 080
OD_CYLINDER: -2.00
OS_VA1: 20/25 -2
OD_VA1: 20/30
OS_CYLINDER: -1.00

## 2025-08-19 ASSESSMENT — KERATOMETRY
METHOD_AUTO_MANUAL: AUTO
OS_AXISANGLE_DEGREES: 152
OD_AXISANGLE_DEGREES: 003
OS_K1POWER_DIOPTERS: 41.25
OS_K2POWER_DIOPTERS: 42.25
OD_K1POWER_DIOPTERS: 40.75
OD_K2POWER_DIOPTERS: 43.00

## 2025-08-19 ASSESSMENT — CONFRONTATIONAL VISUAL FIELD TEST (CVF)
OS_FINDINGS: FULL
OD_FINDINGS: FULL

## 2025-08-19 ASSESSMENT — DECREASING TEAR LAKE - SEVERITY SCORE
OD_DEC_TEARLAKE: 2+
OS_DEC_TEARLAKE: 2+

## 2025-08-19 ASSESSMENT — TONOMETRY
OS_IOP_MMHG: 18
OD_IOP_MMHG: 18